# Patient Record
Sex: FEMALE | Race: WHITE | Employment: OTHER | URBAN - METROPOLITAN AREA
[De-identification: names, ages, dates, MRNs, and addresses within clinical notes are randomized per-mention and may not be internally consistent; named-entity substitution may affect disease eponyms.]

---

## 2017-06-24 ENCOUNTER — HOSPITAL ENCOUNTER (OUTPATIENT)
Dept: MAMMOGRAPHY | Age: 72
Discharge: HOME | End: 2017-06-24
Attending: INTERNAL MEDICINE
Payer: MEDICARE

## 2017-06-24 DIAGNOSIS — Z12.31 VISIT FOR SCREENING MAMMOGRAM: ICD-10-CM

## 2017-06-24 PROCEDURE — 77067 SCR MAMMO BI INCL CAD: CPT

## 2017-07-10 PROBLEM — R87.612 LGSIL ON PAP SMEAR OF CERVIX: Status: ACTIVE | Noted: 2017-07-10

## 2017-07-17 PROBLEM — M81.0 SENILE OSTEOPOROSIS: Status: ACTIVE | Noted: 2017-07-17

## 2017-07-28 ENCOUNTER — HOSPITAL ENCOUNTER (OUTPATIENT)
Dept: CT IMAGING | Age: 72
Discharge: HOME | End: 2017-07-28
Attending: INTERNAL MEDICINE
Payer: MEDICARE

## 2017-07-28 DIAGNOSIS — R10.84 ABDOMINAL PAIN, GENERALIZED: ICD-10-CM

## 2017-07-28 DIAGNOSIS — R63.4 WEIGHT LOSS: ICD-10-CM

## 2017-07-28 PROCEDURE — 74177 CT ABD & PELVIS W/CONTRAST: CPT

## 2017-07-28 PROCEDURE — 74011636320 HC RX REV CODE- 636/320: Performed by: INTERNAL MEDICINE

## 2017-07-28 PROCEDURE — 74011000258 HC RX REV CODE- 258: Performed by: INTERNAL MEDICINE

## 2017-07-28 RX ORDER — SODIUM CHLORIDE 0.9 % (FLUSH) 0.9 %
10 SYRINGE (ML) INJECTION
Status: COMPLETED | OUTPATIENT
Start: 2017-07-28 | End: 2017-07-28

## 2017-07-28 RX ADMIN — SODIUM CHLORIDE 100 ML: 900 INJECTION, SOLUTION INTRAVENOUS at 10:39

## 2017-07-28 RX ADMIN — Medication 10 ML: at 10:39

## 2017-07-28 RX ADMIN — DIATRIZOATE MEGLUMINE AND DIATRIZOATE SODIUM 15 ML: 660; 100 LIQUID ORAL; RECTAL at 10:39

## 2017-07-28 RX ADMIN — IOPAMIDOL 100 ML: 755 INJECTION, SOLUTION INTRAVENOUS at 10:39

## 2017-08-04 ENCOUNTER — HOSPITAL ENCOUNTER (OUTPATIENT)
Dept: INFUSION THERAPY | Age: 72
End: 2017-08-04

## 2017-08-25 ENCOUNTER — APPOINTMENT (RX ONLY)
Dept: URBAN - METROPOLITAN AREA CLINIC 349 | Facility: CLINIC | Age: 72
Setting detail: DERMATOLOGY
End: 2017-08-25

## 2017-08-25 DIAGNOSIS — L82.0 INFLAMED SEBORRHEIC KERATOSIS: ICD-10-CM

## 2017-08-25 DIAGNOSIS — L82.1 OTHER SEBORRHEIC KERATOSIS: ICD-10-CM

## 2017-08-25 DIAGNOSIS — Z87.2 PERSONAL HISTORY OF DISEASES OF THE SKIN AND SUBCUTANEOUS TISSUE: ICD-10-CM

## 2017-08-25 DIAGNOSIS — D22 MELANOCYTIC NEVI: ICD-10-CM | Status: STABLE

## 2017-08-25 PROBLEM — D22.5 MELANOCYTIC NEVI OF TRUNK: Status: ACTIVE | Noted: 2017-08-25

## 2017-08-25 PROCEDURE — ? OTHER

## 2017-08-25 PROCEDURE — 17110 DESTRUCTION B9 LES UP TO 14: CPT

## 2017-08-25 PROCEDURE — 99213 OFFICE O/P EST LOW 20 MIN: CPT | Mod: 25

## 2017-08-25 PROCEDURE — ? COUNSELING

## 2017-08-25 PROCEDURE — ? LIQUID NITROGEN (COSMETIC)

## 2017-08-25 PROCEDURE — ? LIQUID NITROGEN

## 2017-08-25 ASSESSMENT — LOCATION SIMPLE DESCRIPTION DERM
LOCATION SIMPLE: LEFT THIGH
LOCATION SIMPLE: RIGHT FOREARM
LOCATION SIMPLE: CHEST
LOCATION SIMPLE: UPPER BACK

## 2017-08-25 ASSESSMENT — LOCATION ZONE DERM
LOCATION ZONE: LEG
LOCATION ZONE: ARM
LOCATION ZONE: TRUNK

## 2017-08-25 ASSESSMENT — LOCATION DETAILED DESCRIPTION DERM
LOCATION DETAILED: LEFT ANTERIOR LATERAL PROXIMAL THIGH
LOCATION DETAILED: LEFT MEDIAL SUPERIOR CHEST
LOCATION DETAILED: INFERIOR THORACIC SPINE
LOCATION DETAILED: RIGHT DISTAL DORSAL FOREARM

## 2017-08-25 NOTE — PROCEDURE: OTHER
Other (Free Text): Quoted patient $100.00 for cosmetic cryo for SK's on legs and arms. Photo taken of SK cryo test spot.
Detail Level: Detailed
Note Text (......Xxx Chief Complaint.): This diagnosis correlates with the

## 2017-08-25 NOTE — PROCEDURE: LIQUID NITROGEN
Detail Level: Detailed
Medical Necessity Information: It is in your best interest to select a reason for this procedure from the list below. All of these items fulfill various CMS LCD requirements except the new and changing color options.
Consent: The patient's consent was obtained including but not limited to risks of crusting, scabbing, blistering, scarring, darker or lighter pigmentary change, recurrence, incomplete removal and infection.
Add 52 Modifier (Optional): no
Post-Care Instructions: I reviewed with the patient in detail post-care instructions. Patient is to wear sunprotection, and avoid picking at any of the treated lesions. Pt may apply Vaseline to crusted or scabbing areas.
Render Post-Care Instructions In Note?: yes
Medical Necessity Clause: This procedure was medically necessary because the lesions that were treated were:

## 2017-09-05 ENCOUNTER — HOSPITAL ENCOUNTER (OUTPATIENT)
Dept: LAB | Age: 72
Discharge: HOME | End: 2017-09-05

## 2017-09-05 PROCEDURE — 88305 TISSUE EXAM BY PATHOLOGIST: CPT | Performed by: INTERNAL MEDICINE

## 2018-06-14 PROBLEM — L90.0 LICHEN SCLEROSUS: Status: ACTIVE | Noted: 2018-06-14

## 2018-06-29 ENCOUNTER — HOSPITAL ENCOUNTER (OUTPATIENT)
Dept: MAMMOGRAPHY | Age: 73
Discharge: HOME | End: 2018-06-29
Attending: INTERNAL MEDICINE
Payer: MEDICARE

## 2018-06-29 DIAGNOSIS — Z12.31 VISIT FOR SCREENING MAMMOGRAM: ICD-10-CM

## 2018-06-29 PROCEDURE — 77063 BREAST TOMOSYNTHESIS BI: CPT

## 2018-08-21 ENCOUNTER — APPOINTMENT (RX ONLY)
Dept: URBAN - METROPOLITAN AREA CLINIC 349 | Facility: CLINIC | Age: 73
Setting detail: DERMATOLOGY
End: 2018-08-21

## 2018-08-21 DIAGNOSIS — L29.8 OTHER PRURITUS: ICD-10-CM

## 2018-08-21 DIAGNOSIS — L29.89 OTHER PRURITUS: ICD-10-CM

## 2018-08-21 DIAGNOSIS — L70.8 OTHER ACNE: ICD-10-CM | Status: RESOLVING

## 2018-08-21 DIAGNOSIS — L82.1 OTHER SEBORRHEIC KERATOSIS: ICD-10-CM

## 2018-08-21 DIAGNOSIS — L81.4 OTHER MELANIN HYPERPIGMENTATION: ICD-10-CM

## 2018-08-21 DIAGNOSIS — Z87.2 PERSONAL HISTORY OF DISEASES OF THE SKIN AND SUBCUTANEOUS TISSUE: ICD-10-CM

## 2018-08-21 DIAGNOSIS — D22 MELANOCYTIC NEVI: ICD-10-CM | Status: STABLE

## 2018-08-21 PROBLEM — D22.5 MELANOCYTIC NEVI OF TRUNK: Status: ACTIVE | Noted: 2018-08-21

## 2018-08-21 PROCEDURE — ? BODY PHOTOGRAPHY

## 2018-08-21 PROCEDURE — ? COUNSELING

## 2018-08-21 PROCEDURE — ? OTHER

## 2018-08-21 PROCEDURE — 99214 OFFICE O/P EST MOD 30 MIN: CPT

## 2018-08-21 ASSESSMENT — LOCATION ZONE DERM
LOCATION ZONE: ARM
LOCATION ZONE: FACE
LOCATION ZONE: LEG
LOCATION ZONE: TRUNK

## 2018-08-21 ASSESSMENT — LOCATION DETAILED DESCRIPTION DERM
LOCATION DETAILED: LEFT ANTERIOR LATERAL PROXIMAL THIGH
LOCATION DETAILED: INFERIOR THORACIC SPINE
LOCATION DETAILED: RIGHT MEDIAL UPPER BACK
LOCATION DETAILED: LEFT PROXIMAL DORSAL FOREARM
LOCATION DETAILED: LEFT PROXIMAL PRETIBIAL REGION
LOCATION DETAILED: RIGHT ANTERIOR PROXIMAL THIGH
LOCATION DETAILED: RIGHT INFERIOR LATERAL BUCCAL CHEEK

## 2018-08-21 ASSESSMENT — LOCATION SIMPLE DESCRIPTION DERM
LOCATION SIMPLE: RIGHT THIGH
LOCATION SIMPLE: LEFT FOREARM
LOCATION SIMPLE: RIGHT CHEEK
LOCATION SIMPLE: UPPER BACK
LOCATION SIMPLE: RIGHT UPPER BACK
LOCATION SIMPLE: LEFT THIGH
LOCATION SIMPLE: LEFT PRETIBIAL REGION

## 2018-08-21 NOTE — PROCEDURE: BODY PHOTOGRAPHY
Number Of Photographs (Optional- Will Not Render If 0): 1
Consent: Written consent obtained, risks reviewed for whole body photography. Patient understands that photograph costs may not be covered by insurance, and patient is ultimately responsible for payment.
Reason For Photography: The patient is obtaining body photography to observe existing suspicious moles and or monitor for the appearance of any new lesions.
Detail Level: Detailed
Whole Body Statement: The whole body was photographed today.
Was The Entire Body Photographed (Cannot Bill Unless Entire Body Photographed)?: No

## 2018-08-21 NOTE — PROCEDURE: OTHER
Detail Level: Detailed
Other (Free Text): Quoted patient $100.00 for 15 lesions
Other (Free Text): Recommended over the counter Hydrocortisone
Note Text (......Xxx Chief Complaint.): This diagnosis correlates with the

## 2018-10-16 ENCOUNTER — APPOINTMENT (RX ONLY)
Dept: URBAN - METROPOLITAN AREA CLINIC 349 | Facility: CLINIC | Age: 73
Setting detail: DERMATOLOGY
End: 2018-10-16

## 2018-10-31 ENCOUNTER — APPOINTMENT (OUTPATIENT)
Dept: PHYSICAL THERAPY | Age: 73
End: 2018-10-31
Attending: INTERNAL MEDICINE

## 2019-04-26 ENCOUNTER — APPOINTMENT (RX ONLY)
Dept: URBAN - METROPOLITAN AREA CLINIC 349 | Facility: CLINIC | Age: 74
Setting detail: DERMATOLOGY
End: 2019-04-26

## 2019-04-26 DIAGNOSIS — L43.8 OTHER LICHEN PLANUS: ICD-10-CM

## 2019-04-26 DIAGNOSIS — Z71.89 OTHER SPECIFIED COUNSELING: ICD-10-CM

## 2019-04-26 PROCEDURE — ? OTHER

## 2019-04-26 PROCEDURE — ? EDUCATIONAL RESOURCES PROVIDED

## 2019-04-26 PROCEDURE — ? OBSERVATION

## 2019-04-26 PROCEDURE — 99213 OFFICE O/P EST LOW 20 MIN: CPT

## 2019-04-26 PROCEDURE — ? COUNSELING

## 2019-04-26 ASSESSMENT — LOCATION ZONE DERM: LOCATION ZONE: TRUNK

## 2019-04-26 ASSESSMENT — LOCATION SIMPLE DESCRIPTION DERM: LOCATION SIMPLE: CHEST

## 2019-04-26 ASSESSMENT — LOCATION DETAILED DESCRIPTION DERM: LOCATION DETAILED: LEFT MEDIAL SUPERIOR CHEST

## 2019-04-26 NOTE — PROCEDURE: OTHER
Note Text (......Xxx Chief Complaint.): This diagnosis correlates with the
Other (Free Text): Per patient, this lesion has been present for 2 weeks. \\nDiscussed OTC hydrocortisone for irritation if needed.
Detail Level: Detailed

## 2019-06-20 ENCOUNTER — HOSPITAL ENCOUNTER (OUTPATIENT)
Dept: GENERAL RADIOLOGY | Age: 74
Discharge: HOME | End: 2019-06-20
Attending: INTERNAL MEDICINE
Payer: MEDICARE

## 2019-06-20 DIAGNOSIS — R14.0 ABDOMINAL DISTENSION (GASEOUS): ICD-10-CM

## 2019-06-20 PROCEDURE — 74022 RADEX COMPL AQT ABD SERIES: CPT

## 2019-06-21 ENCOUNTER — HOSPITAL ENCOUNTER (OUTPATIENT)
Dept: CT IMAGING | Age: 74
Discharge: HOME | End: 2019-06-21
Attending: INTERNAL MEDICINE
Payer: MEDICARE

## 2019-06-21 DIAGNOSIS — K58.1 IRRITABLE BOWEL SYNDROME WITH CONSTIPATION: ICD-10-CM

## 2019-06-21 DIAGNOSIS — R10.31 RLQ ABDOMINAL PAIN: ICD-10-CM

## 2019-06-21 LAB — CREAT BLD-MCNC: 0.6 MG/DL (ref 0.8–1.5)

## 2019-06-21 PROCEDURE — 74011000258 HC RX REV CODE- 258: Performed by: INTERNAL MEDICINE

## 2019-06-21 PROCEDURE — 74011636320 HC RX REV CODE- 636/320: Performed by: INTERNAL MEDICINE

## 2019-06-21 PROCEDURE — 74177 CT ABD & PELVIS W/CONTRAST: CPT

## 2019-06-21 PROCEDURE — 82565 ASSAY OF CREATININE: CPT

## 2019-06-21 RX ORDER — SODIUM CHLORIDE 0.9 % (FLUSH) 0.9 %
10 SYRINGE (ML) INJECTION
Status: COMPLETED | OUTPATIENT
Start: 2019-06-21 | End: 2019-06-21

## 2019-06-21 RX ADMIN — Medication 10 ML: at 15:18

## 2019-06-21 RX ADMIN — SODIUM CHLORIDE 100 ML: 900 INJECTION, SOLUTION INTRAVENOUS at 15:18

## 2019-06-21 RX ADMIN — IOPAMIDOL 100 ML: 755 INJECTION, SOLUTION INTRAVENOUS at 15:18

## 2019-07-28 ENCOUNTER — HOSPITAL ENCOUNTER (EMERGENCY)
Age: 74
Discharge: HOME | End: 2019-07-29
Attending: EMERGENCY MEDICINE
Payer: MEDICARE

## 2019-07-28 ENCOUNTER — APPOINTMENT (OUTPATIENT)
Dept: GENERAL RADIOLOGY | Age: 74
End: 2019-07-28
Attending: EMERGENCY MEDICINE
Payer: MEDICARE

## 2019-07-28 DIAGNOSIS — S96.921A FOOT LACERATION INVOLVING TENDON, RIGHT, INITIAL ENCOUNTER: Primary | ICD-10-CM

## 2019-07-28 DIAGNOSIS — S91.311A FOOT LACERATION INVOLVING TENDON, RIGHT, INITIAL ENCOUNTER: Primary | ICD-10-CM

## 2019-07-28 PROCEDURE — 73630 X-RAY EXAM OF FOOT: CPT

## 2019-07-28 PROCEDURE — 90715 TDAP VACCINE 7 YRS/> IM: CPT | Performed by: EMERGENCY MEDICINE

## 2019-07-28 PROCEDURE — 99283 EMERGENCY DEPT VISIT LOW MDM: CPT | Performed by: EMERGENCY MEDICINE

## 2019-07-28 PROCEDURE — 74011250636 HC RX REV CODE- 250/636: Performed by: EMERGENCY MEDICINE

## 2019-07-28 PROCEDURE — 90471 IMMUNIZATION ADMIN: CPT | Performed by: EMERGENCY MEDICINE

## 2019-07-28 RX ADMIN — TETANUS TOXOID, REDUCED DIPHTHERIA TOXOID AND ACELLULAR PERTUSSIS VACCINE, ADSORBED 0.5 ML: 5; 2.5; 8; 8; 2.5 SUSPENSION INTRAMUSCULAR at 23:58

## 2019-07-29 VITALS
WEIGHT: 108 LBS | SYSTOLIC BLOOD PRESSURE: 135 MMHG | RESPIRATION RATE: 18 BRPM | DIASTOLIC BLOOD PRESSURE: 62 MMHG | TEMPERATURE: 98.5 F | HEIGHT: 65 IN | OXYGEN SATURATION: 99 % | BODY MASS INDEX: 17.99 KG/M2 | HEART RATE: 82 BPM

## 2019-07-29 PROCEDURE — 77030002916 HC SUT ETHLN J&J -A

## 2019-07-29 PROCEDURE — 75810000293 HC SIMP/SUPERF WND  RPR: Performed by: EMERGENCY MEDICINE

## 2019-07-29 NOTE — ED PROVIDER NOTES
A large glass jar/water bottle struck the edge of a cabinet and broke falling down onto her right foot causing laceration, pain and swelling. She could not get the wound to stop bleeding and presented for evaluation. The history is provided by the patient. Laceration    The incident occurred 1 to 2 hours ago. Pain location: right foot. The laceration is 2 cm in size. Injury mechanism: broken glass. Foreign body present: unknown. The pain is mild. The pain has been constant since onset. Associated symptoms include weakness and loss of motion. Pertinent negatives include no numbness, no tingling, no coolness and no discoloration. The patient's last tetanus shot was more than 10 years ago.        Past Medical History:   Diagnosis Date    Abnormal Pap smear of cervix 7/19/2016    Breast lump     Cancer ([facility] [address] 2010    stage 1 polyps x2 removed during colonscopy    Hypertension     Osteoporosis     Unspecified essential hypertension 10/17/2012       Past Surgical History:   Procedure Laterality Date    BREAST SURGERY PROCEDURE UNLISTED Left 1968    lumpectomy~benign    ENDOSCOPY, COLON, DIAGNOSTIC      polyps    HX BREAST BIOPSY      HX COLONOSCOPY      HX GI      HX GYN      LEEP    HX GYN      colposcopy    HX GYN      [facility]    HX OTHER SURGICAL      colonscopy         Family History:   Problem Relation Age of Onset    Cancer Brother         Brain Tumor    Hypertension Mother     Obesity Mother     Alzheimer Mother     Cancer Father         Lung    Lung Disease Father     Breast Cancer Neg Hx     Colon Cancer Neg Hx        Social History     Socioeconomic History    Marital status:      Spouse name: Not on file    Number of children: Not on file    Years of education: Not on file    Highest education level: Not on file   Occupational History    Not on file   Social Needs    Financial resource strain: Not on file    Food insecurity:     Worry: Not on file     Inability: Not on file  Transportation needs:     Medical: Not on file     Non-medical: Not on file   Tobacco Use    Smoking status: Former Smoker     Packs/day: 1.00     Years: 18.00     Pack years: 18.00     Last attempt to quit: 10/17/1993     Years since quittin.7    Smokeless tobacco: Never Used   Substance and Sexual Activity    Alcohol use: Yes     Alcohol/week: 1.0 standard drinks     Types: 1 - 2 Glasses of wine per week     Comment: per night    Drug use: No    Sexual activity: Never     Birth control/protection: None   Lifestyle    Physical activity:     Days per week: Not on file     Minutes per session: Not on file    Stress: Not on file   Relationships    Social connections:     Talks on phone: Not on file     Gets together: Not on file     Attends Shinto service: Not on file     Active member of club or organization: Not on file     Attends meetings of clubs or organizations: Not on file     Relationship status: Not on file    Intimate partner violence:     Fear of current or ex partner: Not on file     Emotionally abused: Not on file     Physically abused: Not on file     Forced sexual activity: Not on file   Other Topics Concern     Service Not Asked    Blood Transfusions Not Asked    Caffeine Concern No     Comment: coffee, 3-4 times per day    Occupational Exposure Not Asked   Shelvy Go Hazards Not Asked    Sleep Concern Not Asked    Stress Concern Not Asked    Weight Concern Not Asked    Special Diet Not Asked    Back Care Not Asked    Exercise Yes     Comment: walking, aerobics, yoga, 5-6 times per week    Bike Helmet Not Asked    Seat Belt Yes     Comment: always    Self-Exams Yes     Comment: always breast exam   Social History Narrative    Abuse: Feels safe at home, no history of physical abuse, no history of sexual abuse              ALLERGIES: Erythromycin; Penicillin g; Erythromycin; and Pcn [penicillins]    Review of Systems   Neurological: Positive for weakness.  Negative for tingling and numbness. Vitals:    07/28/19 2144   BP: (!) 145/92   Pulse: 98   Resp: 17   Temp: 98.1 °F (36.7 °C)   SpO2: 97%   Weight: 49 kg (108 lb)   Height: 5' 5\" (1.651 m)            Physical Exam   Constitutional: She appears well-developed and well-nourished. Musculoskeletal: She exhibits tenderness (tenderness overright fifth toe and right distal fifth metatarsal.  Limited to no range of motion of left fifth toe. Unable to extend. Sensation intact. Cap refill less than 2 seconds). To posterior sinus pedis and posterior tibial pulses. No pulsatile bleeding from the wound. There is a underlying soft tissue hematoma present   Nursing note and vitals reviewed. MDM  Number of Diagnoses or Management Options  Diagnosis management comments:  Stunned nerve versus injured tendon. X-ray to evaluate for foreign body and rule out fracture. Anesthetized the wound and explore for foreign body and suture. Follow-up for tendon function recheck with orthopedics in the next few days. Amount and/or Complexity of Data Reviewed  Tests in the radiology section of CPT®: ordered and reviewed (X-ray of the right foot shows no fracture and no foreign body. Read by ED physician.)           Wound Repair  Date/Time: 7/29/2019 12:31 AM  Performed by: attendingPreparation: skin prepped with ChloraPrep  Location: right dorsal foot.   Wound length:2.5 cm or less  Anesthesia: local infiltration    Anesthesia:  Local Anesthetic: lidocaine 1% without epinephrine  Anesthetic total: 4 mL  Foreign bodies: no foreign bodies  Irrigation solution: saline  Irrigation method: syringe  Debridement: none  Skin closure: 4-0 nylon  Number of sutures: 5  Technique: simple  Approximation: close  Dressing: antibiotic ointment, gauze roll and pressure dressing  Patient tolerance: Patient tolerated the procedure well with no immediate complications  My total time at bedside, performing this procedure was 1-15 minutes. Comments: I could not visualize tendon to evaluate for injury. No foreign body was visualized or palpated with hemostats.

## 2019-07-29 NOTE — DISCHARGE INSTRUCTIONS
Patient Education   Keep the wound dry for 24 hours then clean it twice daily with antibacterial hand soap and water, dry it with a clean towel/cloth, then apply otc antibiotic ointment and cover it with a bandage or bandaide. You may stop the ointment after 2-3 days but continue to clean, dry and cover the wound until it is healed and ready for suture or staple removal.  Return if signs of infection-increased pain, warmth, redness or pus from the wound. Sutures out in _10__ days-here or with your Dr or the Dr provided. Follow up with [facility] sometime in the early part of this week for wound recheck and tendon recheck. Call in the morning for appointment         Cuts: Care Instructions  Your Care Instructions  A cut can happen anywhere on your body. Stitches, staples, skin adhesives, or pieces of tape called Steri-Strips are sometimes used to keep the edges of a cut together and help it heal. Steri-Strips can be used by themselves or with stitches or staples. Sometimes cuts are left open. If the cut went deep and through the skin, the doctor may have closed the cut in two layers. A deeper layer of stitches brings the deep part of the cut together. These stitches will dissolve and don't need to be removed. The upper layer closure, which could be stitches, staples, Steri-Strips, or adhesive, is what you see on the cut. A cut is often covered by a bandage. The doctor has checked you carefully, but problems can develop later. If you notice any problems or new symptoms, get medical treatment right away. Follow-up care is a key part of your treatment and safety. Be sure to make and go to all appointments, and call your doctor if you are having problems. It's also a good idea to know your test results and keep a list of the medicines you take. How can you care for yourself at home? If a cut is open or closed  · Prop up the sore area on a pillow anytime you sit or lie down during the next 3 days.  Try to keep it above the level of your heart. This will help reduce swelling. · Keep the cut dry for the first 24 to 48 hours. After this, you can shower if your doctor okays it. Pat the cut dry. · Don't soak the cut, such as in a bathtub. Your doctor will tell you when it's safe to get the cut wet. · After the first 24 to 48 hours, clean the cut with soap and water 2 times a day unless your doctor gives you different instructions. ? Don't use hydrogen peroxide or alcohol, which can slow healing. ? You may cover the cut with a thin layer of petroleum jelly and a nonstick bandage. ? If the doctor put a bandage over the cut, put on a new bandage after cleaning the cut or if the bandage gets wet or dirty. · Avoid any activity that could cause your cut to reopen. · Be safe with medicines. Read and follow all instructions on the label. ? If the doctor gave you a prescription medicine for pain, take it as prescribed. ? If you are not taking a prescription pain medicine, ask your doctor if you can take an over-the-counter medicine. If the cut is closed with stitches, staples, or Steri-Strips  · Follow the above instructions for open or closed cuts. · Do not remove the stitches or staples on your own. Your doctor will tell you when to come back to have the stitches or staples removed. · Leave Steri-Strips on until they fall off. If the cut is closed with a skin adhesive  · Follow the above instructions for open or closed cuts. · Leave the skin adhesive on your skin until it falls off on its own. This may take 5 to 10 days. · Do not scratch, rub, or pick at the adhesive. · Do not put the sticky part of a bandage directly on the adhesive. · Do not put any kind of ointment, cream, or lotion over the area. This can make the adhesive fall off too soon. Do not use hydrogen peroxide or alcohol, which can slow healing. When should you call for help?   Call your doctor now or seek immediate medical care if:    · You have new pain, or your pain gets worse.     · The skin near the cut is cold or pale or changes color.     · You have tingling, weakness, or numbness near the cut.     · The cut starts to bleed, and blood soaks through the bandage. Oozing small amounts of blood is normal.     · You have trouble moving the area near the cut.     · You have symptoms of infection, such as:  ? Increased pain, swelling, warmth, or redness around the cut.  ? Red streaks leading from the cut.  ? Pus draining from the cut.  ? A fever.    Watch closely for changes in your health, and be sure to contact your doctor if:    · The cut reopens.     · You do not get better as expected. Where can you learn more? Go to http://jorge-vamshi.info/. Enter M735 in the search box to learn more about \"Cuts: Care Instructions. \"  Current as of: September 23, 2018  Content Version: 12.1  © 4046-8664 [employer]. Care instructions adapted under license by [employer] (which disclaims liability or warranty for this information). If you have questions about a medical condition or this instruction, always ask your healthcare professional. [address] any warranty or liability for your use of this information.

## 2019-07-29 NOTE — ED NOTES
I have reviewed discharge instructions with the patient. The patient verbalized understanding. Patient left ED via Discharge Method: ambulatory to Home with  self). Opportunity for questions and clarification provided. Patient given 0 scripts. To continue your aftercare when you leave the hospital, you may receive an automated call from our care team to check in on how you are doing. This is a free service and part of our promise to provide the best care and service to meet your aftercare needs.  If you have questions, or wish to unsubscribe from this service please call [phone]. Thank you for Choosing our [facility] Emergency Department.

## 2019-07-29 NOTE — ED TRIAGE NOTES
Pt presents with laceration on the top of her right foot a glass shattered and fell onto her foot. Pt is unsure of the date of her last tetanus shot.

## 2019-08-09 ENCOUNTER — HOSPITAL ENCOUNTER (OUTPATIENT)
Dept: MAMMOGRAPHY | Age: 74
Discharge: HOME | End: 2019-08-09
Attending: INTERNAL MEDICINE
Payer: MEDICARE

## 2019-08-09 DIAGNOSIS — Z12.39 BREAST CANCER SCREENING: ICD-10-CM

## 2019-08-09 PROCEDURE — 77063 BREAST TOMOSYNTHESIS BI: CPT

## 2020-05-07 ENCOUNTER — HOSPITAL ENCOUNTER (OUTPATIENT)
Dept: GENERAL RADIOLOGY | Age: 75
Discharge: HOME | End: 2020-05-07

## 2020-05-07 DIAGNOSIS — R61 NIGHT SWEAT: ICD-10-CM

## 2020-05-07 DIAGNOSIS — R68.89 LOW BODY TEMPERATURE: ICD-10-CM

## 2020-06-15 ENCOUNTER — TELEPHONE (OUTPATIENT)
Dept: NUTRITION | Age: 75
End: 2020-06-15

## 2020-06-15 NOTE — TELEPHONE ENCOUNTER
Nutrition Counseling: This is a duplicate referral with an added diagnosis.   Diagnosis  from this referral was added to previous referral and we will close out this referral.    Srini Casas  [phone]

## 2020-08-17 ENCOUNTER — TELEPHONE (OUTPATIENT)
Dept: NUTRITION | Age: 75
End: 2020-08-17

## 2020-08-17 NOTE — TELEPHONE ENCOUNTER
Nutrition Counseling: Contacted pt regarding referral. See notes documented in Nutrition Counseling Referral for details. No further follow-up contact from pt. Will close referral for this office.     [facility]  [phone]

## 2020-09-17 ENCOUNTER — HOSPITAL ENCOUNTER (OUTPATIENT)
Dept: LAB | Age: 75
Discharge: HOME | End: 2020-09-17

## 2020-09-17 PROCEDURE — 88305 TISSUE EXAM BY PATHOLOGIST: CPT

## 2020-09-23 ENCOUNTER — HOSPITAL ENCOUNTER (OUTPATIENT)
Dept: NUTRITION | Age: 75
Discharge: HOME | End: 2020-09-23
Payer: MEDICARE

## 2020-09-23 PROCEDURE — 97802 MEDICAL NUTRITION INDIV IN: CPT

## 2020-09-23 NOTE — PROGRESS NOTES
NUTRITION ASSESSMENT:  Pt did not bring nutrition assessment forms filled out for appointment. Reviewed assessment information to the degree that time permitted. 45 minute appt. Patient History:  Pt is referred by Dr. Valentín Escobar with diagnoses of low body temperature, dizziness, malaise, and abnormal wt loss. Medical history additionally remarkable for osteoporosis, HTN, Cancer (stage 1 polyps x2 removed during colonoscopy). Pt arrives today and gives a history of symptoms described as \"in the past year, I have had flu-like symptoms meaning I ache- just feel sick. I have good moments each day where I can shop or drive. But then I will feel fatigued at other times. \" States she measures her temperature at night and is very concerned that it \"drops to 95. 3. \" States her normal temp is 96.8-97.5. Regarding wt loss pt notes she has lost weight secondary to symptoms of pain associated with some foods or larger meals. But then notes she has found a digestive enzyme supplement that has helped greatly reduce pain. Does tend to have constipation for which she takes magnesium. States she works with a chiropractor who has advised she needs more fiber and protein. RD has access to weight records in system and discussed how pt felt when she weighed 123lb in 2013. She states \"I felt good then. \"  States she \"was heavy\" then. Pt also notes that she felt good when she weighed 112lb. States she would like to weight 110lb. Reports a barrier as \"food is the problem\" and describes to mean that if she experiences pain after eating, she tends to not be able to eat as much. Referring provider clinicals indicate  Physical activity as walking, aerobics, yoga, 5-6 times per week    Pertinent Labs:   (3/10/20) T Chol 231   (5/7/20) Mag 2.6    Nutritionally Relevant Medications:  Reviewed.     Supplements:  Vitamin B12 1000mcg  Vitamin C 1000mg  Vitamin D3 1000iu  Vitamin E 180mg  Magnesium 250mg or 400mg  Zinc 25mg  Digestive Gold 1 with each meal  Colon Health Probiotics with digestive enzymes (1/day)    Factors affecting weight status:   Oral status (dentition): no issues noted.  Smell and taste: no issues noted.  Appetite: Pt describes a desire to eat but notes she is limited on foods (avoids some foods, but isn't specific besides nuts and dairy that she notes osteoporsis consultant has advised her to avoid; was unable to clarify why but notes this person has \"helped her a lot\")   Swallow capacity (dysphagia): no issues noted.  Neurological factors (i.e.dementia, Parkinson's, stroke): no issues noted.  Gastrointestinal: Is somewhat vague but notes she will have pain in her intestinal area (pats lower abdomin) if she doesn't eat the right foods.  Social factors and living conditions: no issues noted.  Food accessibility:no issues noted. Nutrition-Focus Physical Exam findings:  Orbital hollowing observed; temporal and clavicular wasting observed. FOOD/NUTRITION HISTORY:   Pt eats 3 meals/day with unclear on snacks. Diet appears low in calcium-containing choices, low in servings of fruits and vegetables, low in fiber, low in protein at lunch, and low in kcal.. Pt drinks distilled water/day, \"a lot\" of herbal tea/day (states it can cause some pain), and occasional coffee/day. Pt appears able to obtain appropriate nutritional choices as desired. Diet Recall:  Snack: Ensure 1/2  Breakfast:3 hard boiled eggs, 1 slice whole grain toast, dark chocolate   Snack: Ensure 1/2  Lunch:  Blueberries, apple, whole grain cereal  Dinner:  Chicken breast or salmon, 1 small boiled potato, green beans      ANTHROPOMETRIC DATA:  Ht: 5'5 (1.651 m)  Wt: 104.2lb - 1lb (clothes, shoes) = 103.3lb (46.8 kg) Measured in office  BMI: 17.1 (Underweight BMI)   BMI of >22 recommended for geriatrics pts. Optimal BMI: 23-30. IBW for minimum BMI: 132lb;  Highest weight in past 7 years: 123lb 9.6oz    Weight history in [facility]:  WT / BMI WEIGHT BODY MASS INDEX   5/26/2020 100 lb 16.64 kg/m2   5/12/2020 100 lb 16.64 kg/m2   5/7/2020 103 lb 17.14 kg/m2   5/1/2020 100 lb 16.64 kg/m2   3/10/2020 102 lb 3.2 oz 17.01 kg/m2   1/6/2020 104 lb 17.31 kg/m2   8/12/2019 106 lb 17.64 kg/m2   8/8/2019 107 lb 6.4 oz 17.87 kg/m2   7/28/2019 108 lb 17.97 kg/m2   1/10/2019 113 lb 18.8 kg/m2   10/29/2018 113 lb 9.6 oz 18.9 kg/m2   10/25/2018 110 lb 18.3 kg/m2   9/6/2018 112 lb 3.2 oz 18.11 kg/m2   6/14/2018 111 lb 17.92 kg/m2   1/30/2018 114 lb 18.4 kg/m2   12/13/2017 112 lb 18.08 kg/m2   8/22/2017 109 lb 17.59 kg/m2   8/2/2017 104 lb 16.79 kg/m2   7/17/2017 106 lb 17.11 kg/m2   7/10/2017 107 lb 17.27 kg/m2   6/6/2017 103 lb 16.62 kg/m2   2/6/2017 113 lb 18.24 kg/m2   10/10/2016 110 lb 17.23 kg/m2   6/10/2016 108 lb 2 oz 17.46 kg/m2   6/3/2016 108 lb 2 oz 17.46 kg/m2   5/8/2015 117 lb 18.89 kg/m2   4/3/2014 118 lb 12.8 oz 19.18 kg/m2   3/21/2013 123 lb 9.6 oz 19.96 kg/m2   10/17/2012 122 lb 19.7 kg/m2       Estimated Nutritional Needs to Maintain Current Wt using [employer]. Jeor Equation, activity factor of 1.3:   1261 kcal/day +/- 10% MSJ margin of error     EER for weight gain: 1300 + 300 = 1600 kcal/day  Carbohydrates: 180 gm/day (45% of  kcal) or 45 gm/meal(3) with 30 gm/snack(2-3) (Approximately 300-400 kcal/meal with 200-300 kcal/snack)  Protein: 78gm/day (20% of kcal)  Fat: 61 gm/day (35% of  kcal)  Fluids: 1.5 L/day (1 ml/kcal)    NUTRITION DIAGNOSIS:   Inadequate oral intake r/t altered GI function as evidenced by pt report of intestinal pain following some meals or following consumption of certain foods. NUTRITION INTERVENTION:  Appointment length: 45 minutes. Nutrition Education:   · Discussed pts intake and activity patterns as above. Gave details for weight gain strategies.  (Guidelines in handout)   · Discussed the strategy of 3 balanced meals and 2 -3 planned snacks in between meals- building up tolerance to the portions RD recommends on MyPlate template given to pt (Reviewed how to use the template as much as time permitted). Encouraged pt to write down a few days of intake for a follow-up appointment that pt notes she'd like to schedule. Motivational Interviewing:   · Discussed pt present health risks with underweight BMI. Explained benefits of increasing wt and improving BMI class. · Reminded pt that she had noted she felt better when she weighed more. Discussed pt's willingness to gain weight. Pt states she is willing but \"food is the problem. \" (If experiencing pain)  · Agreed with pt that if she can gain back some weight, she can observe if that is one factor that may help with her fatigue. · Explained BMI to pt and the recommendations for her age group. Pt has never had a BMI of 22 or greater. Noted regaining weight to a former adult UBW would be a good step. Goal Setting:  Goal: Pt will increase caloric intake and structure eating patterns and food choices to promote wt gain @ 0.5-1#/week. Plan: Pt will use My Plate template to build adequate nutrition at meals and have 2 snacks/day ( each of 1 whole container of Ensure Enlive.)    Materials Given:  MNT Gaining Weight Guidelines  \"My Plate\" templates for meal planning  CIB homemade shake recipes (500-700 kcal)  Hints to Increase Calories and Protein    MONITORING AND EVALUATION DETAILS:  Follow up appt: 10/14/20 @ 11:00pm.  Follow-up Monitoring Plans: Will monitor any wt change. Will assess and address any barriers to or success with plans. Will review meals and snack logs to compare with goals.     Pavan Marion, MS, RD, CSSD, LD  W: [phone]

## 2020-09-30 ENCOUNTER — TRANSCRIBE ORDER (OUTPATIENT)
Dept: SCHEDULING | Age: 75
End: 2020-09-30

## 2020-09-30 DIAGNOSIS — Z12.31 VISIT FOR SCREENING MAMMOGRAM: Primary | ICD-10-CM

## 2020-10-01 ENCOUNTER — DOCUMENTATION ONLY (OUTPATIENT)
Dept: NUTRITION | Age: 75
End: 2020-10-01

## 2020-10-01 NOTE — PROGRESS NOTES
Nutrition Counseling:  Pt is referred by Dr. Igor Montoya. Pt left VM to cancel her f/u appt. States she has family coming into town and that she will call to reschedule.     Jesse Prajapati MS, RD, CSSD, LD  W: [phone]

## 2020-10-14 ENCOUNTER — HOSPITAL ENCOUNTER (OUTPATIENT)
Dept: NUTRITION | Age: 75
End: 2020-10-14

## 2020-10-21 ENCOUNTER — HOSPITAL ENCOUNTER (OUTPATIENT)
Dept: MAMMOGRAPHY | Age: 75
Discharge: HOME | End: 2020-10-21
Attending: INTERNAL MEDICINE
Payer: MEDICARE

## 2020-10-21 DIAGNOSIS — Z12.31 VISIT FOR SCREENING MAMMOGRAM: ICD-10-CM

## 2020-10-21 PROCEDURE — 77063 BREAST TOMOSYNTHESIS BI: CPT

## 2020-10-27 ENCOUNTER — TELEPHONE (OUTPATIENT)
Dept: NUTRITION | Age: 75
End: 2020-10-27

## 2020-10-27 PROBLEM — K58.9 IBS (IRRITABLE BOWEL SYNDROME): Status: ACTIVE | Noted: 2020-10-27

## 2020-10-27 NOTE — TELEPHONE ENCOUNTER
Nutrition Counseling:Pt is referred by Dr. Oneil White. Left VM requesting call back in attempt to check on any f/u rescheduling needs.     Aleena Tao MS, RD, CSSD, LD  W: [phone]
No

## 2020-11-09 ENCOUNTER — TELEPHONE (OUTPATIENT)
Dept: NUTRITION | Age: 75
End: 2020-11-09

## 2020-11-09 NOTE — TELEPHONE ENCOUNTER
Nutrition Counseling:  Pt referred by Dr. Catalina Nelson. Spoke with pt to see if pt would like to reschedule her cancelled follow-up appt. Pt states she is still experiencing barriers to increasing her food intake. States she has added a few things but already was using ideas I recommended. Declines follow-up at this time as she states she is continuing with appts to determine what is causing her stomach pain. Notes she presently has a gum infection and will have that assessed. Will close referral on this end.      Holly Calderon, MS, RD, CSSD, LD  W: [phone]

## 2021-06-01 PROBLEM — Z12.39 BREAST CANCER SCREENING: Status: ACTIVE | Noted: 2021-06-01

## 2021-06-01 PROBLEM — R53.83 FATIGUE: Status: ACTIVE | Noted: 2021-06-01

## 2021-06-01 PROBLEM — R87.612 LGSIL ON PAP SMEAR OF CERVIX: Status: RESOLVED | Noted: 2017-07-10 | Resolved: 2021-06-01

## 2021-06-01 PROBLEM — L90.0 LICHEN SCLEROSUS: Status: RESOLVED | Noted: 2018-06-14 | Resolved: 2021-06-01

## 2021-06-01 PROBLEM — R74.01 ELEVATED ALT MEASUREMENT: Status: ACTIVE | Noted: 2021-06-01

## 2021-06-01 PROBLEM — M79.10 MYALGIA: Status: ACTIVE | Noted: 2021-06-01

## 2021-06-01 PROBLEM — Z13.220 LIPID SCREENING: Status: ACTIVE | Noted: 2021-06-01

## 2021-06-28 ENCOUNTER — HOSPITAL ENCOUNTER (OUTPATIENT)
Dept: ULTRASOUND IMAGING | Age: 76
Discharge: HOME | End: 2021-06-28
Attending: INTERNAL MEDICINE

## 2021-06-28 DIAGNOSIS — R10.10 PAIN OF UPPER ABDOMEN: ICD-10-CM

## 2021-06-28 DIAGNOSIS — R74.01 ELEVATED ALT MEASUREMENT: ICD-10-CM

## 2021-07-19 ENCOUNTER — TRANSCRIBE ORDER (OUTPATIENT)
Dept: SCHEDULING | Age: 76
End: 2021-07-19

## 2021-07-19 DIAGNOSIS — R11.0 NAUSEA: ICD-10-CM

## 2021-07-19 DIAGNOSIS — R63.4 LOSS OF WEIGHT: ICD-10-CM

## 2021-07-19 DIAGNOSIS — R10.84 DIFFUSE ABDOMINAL PAIN: Primary | ICD-10-CM

## 2021-07-20 PROBLEM — Z23 ENCOUNTER FOR IMMUNIZATION: Status: ACTIVE | Noted: 2021-07-20

## 2021-07-21 ENCOUNTER — HOSPITAL ENCOUNTER (OUTPATIENT)
Dept: CT IMAGING | Age: 76
Discharge: HOME | End: 2021-07-21
Attending: NURSE PRACTITIONER

## 2021-07-21 DIAGNOSIS — R11.0 NAUSEA: ICD-10-CM

## 2021-07-21 DIAGNOSIS — R63.4 LOSS OF WEIGHT: ICD-10-CM

## 2021-07-21 DIAGNOSIS — R10.84 DIFFUSE ABDOMINAL PAIN: ICD-10-CM

## 2021-07-21 RX ORDER — SODIUM CHLORIDE 0.9 % (FLUSH) 0.9 %
10 SYRINGE (ML) INJECTION
Status: COMPLETED | OUTPATIENT
Start: 2021-07-21 | End: 2021-07-21

## 2021-07-21 RX ADMIN — Medication 10 ML: at 10:19

## 2021-08-04 PROBLEM — K86.81 EXOCRINE PANCREATIC INSUFFICIENCY: Status: ACTIVE | Noted: 2021-08-04

## 2021-08-20 ENCOUNTER — HOSPITAL ENCOUNTER (INPATIENT)
Age: 76
LOS: 1 days | Discharge: HOME | DRG: 641 | End: 2021-08-22
Attending: EMERGENCY MEDICINE | Admitting: FAMILY MEDICINE
Payer: MEDICARE

## 2021-08-20 DIAGNOSIS — E87.1 HYPONATREMIA: Primary | ICD-10-CM

## 2021-08-20 DIAGNOSIS — R53.1 WEAKNESS: ICD-10-CM

## 2021-08-20 DIAGNOSIS — R53.83 FATIGUE, UNSPECIFIED TYPE: ICD-10-CM

## 2021-08-20 PROCEDURE — 93005 ELECTROCARDIOGRAM TRACING: CPT | Performed by: EMERGENCY MEDICINE

## 2021-08-20 PROCEDURE — 80053 COMPREHEN METABOLIC PANEL: CPT

## 2021-08-20 PROCEDURE — 83605 ASSAY OF LACTIC ACID: CPT

## 2021-08-20 PROCEDURE — 83690 ASSAY OF LIPASE: CPT

## 2021-08-20 PROCEDURE — 85025 COMPLETE CBC W/AUTO DIFF WBC: CPT

## 2021-08-20 PROCEDURE — 96360 HYDRATION IV INFUSION INIT: CPT

## 2021-08-20 PROCEDURE — 99285 EMERGENCY DEPT VISIT HI MDM: CPT

## 2021-08-20 RX ORDER — SODIUM CHLORIDE 0.9 % (FLUSH) 0.9 %
5-10 SYRINGE (ML) INJECTION EVERY 8 HOURS
Status: DISCONTINUED | OUTPATIENT
Start: 2021-08-20 | End: 2021-08-22 | Stop reason: HOSPADM

## 2021-08-20 RX ORDER — SODIUM CHLORIDE 0.9 % (FLUSH) 0.9 %
5-10 SYRINGE (ML) INJECTION AS NEEDED
Status: DISCONTINUED | OUTPATIENT
Start: 2021-08-20 | End: 2021-08-22 | Stop reason: HOSPADM

## 2021-08-21 PROBLEM — E87.1 HYPONATREMIA: Status: ACTIVE | Noted: 2021-08-21

## 2021-08-21 PROBLEM — E87.6 HYPOKALEMIA: Status: ACTIVE | Noted: 2021-08-21

## 2021-08-21 PROBLEM — R53.1 WEAKNESS GENERALIZED: Status: ACTIVE | Noted: 2021-08-21

## 2021-08-21 LAB
ALBUMIN SERPL-MCNC: 3.5 G/DL (ref 3.2–4.6)
ALBUMIN/GLOB SERPL: 1.2 {RATIO} (ref 1.2–3.5)
ALP SERPL-CCNC: 62 U/L (ref 50–136)
ALT SERPL-CCNC: 57 U/L (ref 12–65)
ANION GAP SERPL CALC-SCNC: 11 MMOL/L (ref 7–16)
ANION GAP SERPL CALC-SCNC: 2 MMOL/L (ref 7–16)
APPEARANCE UR: CLEAR
AST SERPL-CCNC: 42 U/L (ref 15–37)
BACTERIA URNS QL MICRO: 0 /HPF
BASOPHILS # BLD: 0.1 K/UL (ref 0–0.2)
BASOPHILS NFR BLD: 1 % (ref 0–2)
BILIRUB SERPL-MCNC: 0.6 MG/DL (ref 0.2–1.1)
BILIRUB UR QL: NEGATIVE
BUN SERPL-MCNC: 5 MG/DL (ref 8–23)
BUN SERPL-MCNC: 7 MG/DL (ref 8–23)
CALCIUM SERPL-MCNC: 8.8 MG/DL (ref 8.3–10.4)
CALCIUM SERPL-MCNC: 9.5 MG/DL (ref 8.3–10.4)
CASTS URNS QL MICRO: ABNORMAL /LPF
CHLORIDE SERPL-SCNC: 89 MMOL/L (ref 98–107)
CHLORIDE SERPL-SCNC: 99 MMOL/L (ref 98–107)
CO2 SERPL-SCNC: 24 MMOL/L (ref 21–32)
CO2 SERPL-SCNC: 32 MMOL/L (ref 21–32)
COLOR UR: YELLOW
COVID-19 RAPID TEST, COVR: NOT DETECTED
CREAT SERPL-MCNC: 0.37 MG/DL (ref 0.6–1)
CREAT SERPL-MCNC: 0.39 MG/DL (ref 0.6–1)
DIFFERENTIAL METHOD BLD: ABNORMAL
EOSINOPHIL # BLD: 0.1 K/UL (ref 0–0.8)
EOSINOPHIL NFR BLD: 2 % (ref 0.5–7.8)
EPI CELLS #/AREA URNS HPF: ABNORMAL /HPF
ERYTHROCYTE [DISTWIDTH] IN BLOOD BY AUTOMATED COUNT: 12.3 % (ref 11.9–14.6)
GLOBULIN SER CALC-MCNC: 3 G/DL (ref 2.3–3.5)
GLUCOSE SERPL-MCNC: 112 MG/DL (ref 65–100)
GLUCOSE SERPL-MCNC: 96 MG/DL (ref 65–100)
GLUCOSE UR STRIP.AUTO-MCNC: NEGATIVE MG/DL
HCT VFR BLD AUTO: 34.9 % (ref 35.8–46.3)
HGB BLD-MCNC: 11.8 G/DL (ref 11.7–15.4)
HGB UR QL STRIP: ABNORMAL
IMM GRANULOCYTES # BLD AUTO: 0.1 K/UL (ref 0–0.5)
IMM GRANULOCYTES NFR BLD AUTO: 1 % (ref 0–5)
KETONES UR QL STRIP.AUTO: 40 MG/DL
LACTATE SERPL-SCNC: 0.7 MMOL/L (ref 0.4–2)
LEUKOCYTE ESTERASE UR QL STRIP.AUTO: NEGATIVE
LIPASE SERPL-CCNC: 162 U/L (ref 73–393)
LYMPHOCYTES # BLD: 1.5 K/UL (ref 0.5–4.6)
LYMPHOCYTES NFR BLD: 23 % (ref 13–44)
MAGNESIUM SERPL-MCNC: 2.3 MG/DL (ref 1.8–2.4)
MCH RBC QN AUTO: 30.6 PG (ref 26.1–32.9)
MCHC RBC AUTO-ENTMCNC: 33.8 G/DL (ref 31.4–35)
MCV RBC AUTO: 90.6 FL (ref 79.6–97.8)
MONOCYTES # BLD: 0.6 K/UL (ref 0.1–1.3)
MONOCYTES NFR BLD: 9 % (ref 4–12)
NEUTS SEG # BLD: 4.4 K/UL (ref 1.7–8.2)
NEUTS SEG NFR BLD: 66 % (ref 43–78)
NITRITE UR QL STRIP.AUTO: NEGATIVE
NRBC # BLD: 0 K/UL (ref 0–0.2)
PH UR STRIP: 7 [PH] (ref 5–9)
PLATELET # BLD AUTO: 220 K/UL (ref 150–450)
PMV BLD AUTO: 9.1 FL (ref 9.4–12.3)
POTASSIUM SERPL-SCNC: 3.4 MMOL/L (ref 3.5–5.1)
POTASSIUM SERPL-SCNC: 4 MMOL/L (ref 3.5–5.1)
PROT SERPL-MCNC: 6.5 G/DL (ref 6.3–8.2)
PROT UR STRIP-MCNC: NEGATIVE MG/DL
RBC # BLD AUTO: 3.85 M/UL (ref 4.05–5.2)
RBC #/AREA URNS HPF: ABNORMAL /HPF
SARS-COV-2, COV2: NORMAL
SODIUM SERPL-SCNC: 124 MMOL/L (ref 136–145)
SODIUM SERPL-SCNC: 133 MMOL/L (ref 136–145)
SODIUM SERPL-SCNC: 139 MMOL/L (ref 136–145)
SODIUM SERPL-SCNC: 140 MMOL/L (ref 136–145)
SODIUM SERPL-SCNC: 140 MMOL/L (ref 136–145)
SODIUM UR-SCNC: 37 MMOL/L
SOURCE, COVRS: NORMAL
SP GR UR REFRACTOMETRY: 1 (ref 1–1.02)
TSH SERPL DL<=0.005 MIU/L-ACNC: 0.65 UIU/ML
UROBILINOGEN UR QL STRIP.AUTO: 0.2 EU/DL (ref 0.2–1)
WBC # BLD AUTO: 6.8 K/UL (ref 4.3–11.1)
WBC URNS QL MICRO: 0 /HPF

## 2021-08-21 PROCEDURE — 74011250637 HC RX REV CODE- 250/637: Performed by: FAMILY MEDICINE

## 2021-08-21 PROCEDURE — 74011250636 HC RX REV CODE- 250/636: Performed by: FAMILY MEDICINE

## 2021-08-21 PROCEDURE — 84295 ASSAY OF SERUM SODIUM: CPT

## 2021-08-21 PROCEDURE — 84443 ASSAY THYROID STIM HORMONE: CPT

## 2021-08-21 PROCEDURE — 65270000029 HC RM PRIVATE

## 2021-08-21 PROCEDURE — 74011250636 HC RX REV CODE- 250/636: Performed by: EMERGENCY MEDICINE

## 2021-08-21 PROCEDURE — 74011250637 HC RX REV CODE- 250/637: Performed by: EMERGENCY MEDICINE

## 2021-08-21 PROCEDURE — 83735 ASSAY OF MAGNESIUM: CPT

## 2021-08-21 PROCEDURE — 80048 BASIC METABOLIC PNL TOTAL CA: CPT

## 2021-08-21 PROCEDURE — 84300 ASSAY OF URINE SODIUM: CPT

## 2021-08-21 PROCEDURE — 36415 COLL VENOUS BLD VENIPUNCTURE: CPT

## 2021-08-21 PROCEDURE — 81001 URINALYSIS AUTO W/SCOPE: CPT

## 2021-08-21 PROCEDURE — 87635 SARS-COV-2 COVID-19 AMP PRB: CPT

## 2021-08-21 RX ORDER — ACETAMINOPHEN 650 MG/1
650 SUPPOSITORY RECTAL
Status: DISCONTINUED | OUTPATIENT
Start: 2021-08-21 | End: 2021-08-22 | Stop reason: HOSPADM

## 2021-08-21 RX ORDER — SODIUM CHLORIDE 0.9 % (FLUSH) 0.9 %
5-40 SYRINGE (ML) INJECTION EVERY 8 HOURS
Status: DISCONTINUED | OUTPATIENT
Start: 2021-08-21 | End: 2021-08-22 | Stop reason: HOSPADM

## 2021-08-21 RX ORDER — CHOLECALCIFEROL (VITAMIN D3) 125 MCG
5 CAPSULE ORAL
Status: DISCONTINUED | OUTPATIENT
Start: 2021-08-21 | End: 2021-08-22 | Stop reason: HOSPADM

## 2021-08-21 RX ORDER — ENOXAPARIN SODIUM 100 MG/ML
40 INJECTION SUBCUTANEOUS DAILY
Status: DISCONTINUED | OUTPATIENT
Start: 2021-08-21 | End: 2021-08-22 | Stop reason: HOSPADM

## 2021-08-21 RX ORDER — DEXTROSE MONOHYDRATE 50 MG/ML
100 INJECTION, SOLUTION INTRAVENOUS CONTINUOUS
Status: DISCONTINUED | OUTPATIENT
Start: 2021-08-21 | End: 2021-08-22

## 2021-08-21 RX ORDER — SODIUM CHLORIDE 9 MG/ML
75 INJECTION, SOLUTION INTRAVENOUS CONTINUOUS
Status: DISCONTINUED | OUTPATIENT
Start: 2021-08-21 | End: 2021-08-21

## 2021-08-21 RX ORDER — POTASSIUM CHLORIDE 20 MEQ/1
40 TABLET, EXTENDED RELEASE ORAL
Status: DISCONTINUED | OUTPATIENT
Start: 2021-08-21 | End: 2021-08-21

## 2021-08-21 RX ORDER — ACETAMINOPHEN 325 MG/1
650 TABLET ORAL
Status: DISCONTINUED | OUTPATIENT
Start: 2021-08-21 | End: 2021-08-22 | Stop reason: HOSPADM

## 2021-08-21 RX ORDER — ONDANSETRON 2 MG/ML
4 INJECTION INTRAMUSCULAR; INTRAVENOUS
Status: DISCONTINUED | OUTPATIENT
Start: 2021-08-21 | End: 2021-08-22 | Stop reason: HOSPADM

## 2021-08-21 RX ORDER — POLYETHYLENE GLYCOL 3350 17 G/17G
17 POWDER, FOR SOLUTION ORAL DAILY
Status: DISCONTINUED | OUTPATIENT
Start: 2021-08-21 | End: 2021-08-22 | Stop reason: HOSPADM

## 2021-08-21 RX ORDER — LANOLIN ALCOHOL/MO/W.PET/CERES
400 CREAM (GRAM) TOPICAL
Status: DISCONTINUED | OUTPATIENT
Start: 2021-08-21 | End: 2021-08-22 | Stop reason: HOSPADM

## 2021-08-21 RX ORDER — SODIUM CHLORIDE 0.9 % (FLUSH) 0.9 %
5-40 SYRINGE (ML) INJECTION AS NEEDED
Status: DISCONTINUED | OUTPATIENT
Start: 2021-08-21 | End: 2021-08-22 | Stop reason: HOSPADM

## 2021-08-21 RX ORDER — PROMETHAZINE HYDROCHLORIDE 25 MG/1
12.5 TABLET ORAL
Status: DISCONTINUED | OUTPATIENT
Start: 2021-08-21 | End: 2021-08-22 | Stop reason: HOSPADM

## 2021-08-21 RX ADMIN — DEXTROSE MONOHYDRATE 100 ML/HR: 5 INJECTION, SOLUTION INTRAVENOUS at 21:35

## 2021-08-21 RX ADMIN — SODIUM CHLORIDE 1000 ML: 900 INJECTION, SOLUTION INTRAVENOUS at 00:44

## 2021-08-21 RX ADMIN — Medication 10 ML: at 08:31

## 2021-08-21 RX ADMIN — Medication 5 MG: at 21:35

## 2021-08-21 RX ADMIN — POTASSIUM BICARBONATE 40 MEQ: 391 TABLET, EFFERVESCENT ORAL at 00:53

## 2021-08-21 RX ADMIN — Medication 10 ML: at 21:39

## 2021-08-21 RX ADMIN — ACETAMINOPHEN 650 MG: 325 TABLET, FILM COATED ORAL at 10:18

## 2021-08-21 RX ADMIN — Medication 400 MG: at 21:35

## 2021-08-21 RX ADMIN — Medication 5 MG: at 02:25

## 2021-08-21 RX ADMIN — Medication 10 ML: at 21:40

## 2021-08-21 RX ADMIN — SODIUM CHLORIDE 75 ML/HR: 900 INJECTION, SOLUTION INTRAVENOUS at 08:37

## 2021-08-21 NOTE — PROGRESS NOTES
SW reviewed patient's chart and conducted a baseline assessment. Discharge plan at this time is as follows:     Care Management Interventions  PCP Verified by CM: Yes  Mode of Transport at Discharge: Self  Transition of Care Consult (CM Consult): Discharge Planning  Current Support Network: Own Home  Confirm Follow Up Transport: Friends  Discharge Location  Discharge Placement: Home      *Please note that discharge plans can change throughout an inpatient admission.  Ensure that you are referring to the most recent social work/nurse case management note for current discharge plan*     Rocael Petersen, [address] [place] [address] Work   [facility]    * [email or web address]

## 2021-08-21 NOTE — PROGRESS NOTES
SW met with patient, confirmed demographic information. Patient lives alone with no nearby relatives, but has local friends. Patient reports being independent at home, does not use assistive devices to ambulate, and denies any falls. Patient witnessed by SW ambulating in the halls. States that she walks for at least 20 minutes a day and does exercises while watching TV. She states she's very active. Patient is also established with primary care and is seen regularly. No needs identified from [facility] at this time.      Sukhdev Alford LMSW    [facility]    * [email or web address]

## 2021-08-21 NOTE — ED PROVIDER NOTES
Mask was worn during the entire patient examination. Lio Sosa is a 68 y.o. female who presents to the ED with a chief complaint of fatigue. Patient states she has had about 17 months of constant fatigue and weakness as well as some myalgias and no one has been able to figure out. She reports that she had very severe weakness today was unable to walk. The weakness was bilateral.  No falls or direct injury. She still states she feels weak. She did have some report of vomiting. The history is provided by the patient. Fatigue  Associated symptoms include vomiting and nausea. Pertinent negatives include no chest pain and no headaches. Nausea   Associated symptoms include arthralgias. Pertinent negatives include no abdominal pain, no headaches and no headaches.         Past Medical History:   Diagnosis Date    Atrophic vaginitis     Colon cancer ([facility] [address] 2010    stage 1 polyps x2 removed during colonscopy    History of abnormal cervical Pap smear     S/P cervical conization 2016    IBS (irritable bowel syndrome)     Lichen sclerosus [id number]/41/6689    Osteoporosis        Past Surgical History:   Procedure Laterality Date    ENDOSCOPY, COLON, DIAGNOSTIC      polyps    HX BREAST BIOPSY      HX COLONOSCOPY  09/2020    HX GI      HX GYN      LEEP    HX GYN      colposcopy    HX GYN      [facility]    HX OTHER SURGICAL      colonscopy    [place] BREAST SURGERY PROCEDURE UNLISTED Left 1968    lumpectomy~benign         Family History:   Problem Relation Age of Onset    Cancer Brother         Brain Tumor    Hypertension Mother     Obesity Mother     Alzheimer Mother     Cancer Father         Lung    Lung Disease Father     Breast Cancer Neg Hx     Colon Cancer Neg Hx        Social History     Socioeconomic History    Marital status:      Spouse name: Not on file    Number of children: Not on file    Years of education: Not on file    Highest education level: Not on file   Occupational History    Occupation: Therapist   Tobacco Use    Smoking status: Former Smoker     Packs/day: 1.00     Years: 18.00     Pack years: 18.00     Quit date: 10/17/1993     Years since quittin.8    Smokeless tobacco: Never Used   Vaping Use    Vaping Use: Never used   Substance and Sexual Activity    Alcohol use: Yes     Comment: capful of wine every night    Drug use: No    Sexual activity: Never     Birth control/protection: None   Other Topics Concern     Service Not Asked    Blood Transfusions Not Asked    Caffeine Concern No     Comment: coffee, 3-4 times per day    Occupational Exposure Not Asked   [place] Hazards Not Asked    Sleep Concern Not Asked    Stress Concern Not Asked    Weight Concern Not Asked    Special Diet Not Asked    Back Care Not Asked    Exercise Yes     Comment: walking, aerobics, yoga, 5-6 times per week    Bike Helmet Not Asked    Seat Belt Yes     Comment: always    Self-Exams Yes     Comment: always breast exam   Social History Narrative    Abuse: Feels safe at home, no history of physical abuse, no history of sexual abuse          Social Determinants of Health     Financial Resource Strain:     Difficulty of Paying Living Expenses:    Food Insecurity:     Worried About Running Out of Food in the Last Year:     Ran Out of Food in the Last Year:    Transportation Needs:     Lack of Transportation (Medical):      Lack of Transportation (Non-Medical):    Physical Activity:     Days of Exercise per Week:     Minutes of Exercise per Session:    Stress:     Feeling of Stress :    Social Connections:     Frequency of Communication with Friends and Family:     Frequency of Social Gatherings with Friends and Family:     Attends [employer] Services:     Active Member of Clubs or Organizations:     Attends Club or Organization Meetings:     Marital Status:    Intimate Partner Violence:     Fear of Current or Ex-Partner:     Emotionally Abused:     Physically Abused:     Sexually Abused: ALLERGIES: Erythromycin, Fluocinonide, Fosamax [alendronate], and Paxil [paroxetine hcl]    Review of Systems   Constitutional: Positive for fatigue. HENT: Negative for congestion. Respiratory: Negative for apnea, chest tightness, wheezing and stridor. Cardiovascular: Negative for chest pain and palpitations. Gastrointestinal: Positive for nausea and vomiting. Negative for abdominal distention and abdominal pain. Musculoskeletal: Positive for arthralgias. Negative for back pain and neck stiffness. Skin: Negative for color change and wound. Neurological: Positive for weakness. Negative for dizziness, tremors, syncope, facial asymmetry, light-headedness and headaches. All other systems reviewed and are negative. Vitals:    08/21/21 0411 08/21/21 0511 08/21/21 0611 08/21/21 0711   BP: (!) 114/55 (!) 108/56 115/60 122/61   Pulse: 73 68 73 74   Resp: 29   20   Temp:    97.8 °F (36.6 °C)   SpO2: 97% 96%  96%   Weight:       Height:                Physical Exam  Vitals and nursing note reviewed. Constitutional:       General: She is not in acute distress. Appearance: Normal appearance. She is well-developed. She is not ill-appearing, toxic-appearing or diaphoretic. HENT:      Head: Normocephalic and atraumatic. Eyes:      General: No scleral icterus. Conjunctiva/sclera: Conjunctivae normal.   Cardiovascular:      Rate and Rhythm: Normal rate and regular rhythm. Heart sounds: No murmur heard. No friction rub. No gallop. Pulmonary:      Effort: Pulmonary effort is normal. No respiratory distress. Breath sounds: No stridor. No wheezing, rhonchi or rales. Chest:      Chest wall: No tenderness. Abdominal:      General: Abdomen is flat. Palpations: There is no mass. Tenderness: There is no abdominal tenderness. There is no guarding or rebound. Hernia: No hernia is present.    Musculoskeletal:         General: No swelling or tenderness. Normal range of motion. Cervical back: No rigidity. Skin:     General: Skin is warm. Capillary Refill: Capillary refill takes less than 2 seconds. Coloration: Skin is not jaundiced or pale. Neurological:      General: No focal deficit present. Mental Status: She is alert and oriented to person, place, and time. Mental status is at baseline. Psychiatric:         Mood and Affect: Mood normal.         Behavior: Behavior normal.          MDM  Number of Diagnoses or Management Options  Diagnosis management comments: Patient had very low sodium levels chloride and potassium are also low. When I questioned her about water intake she does drink at least a gallon of distilled water daily. She likely has delusional electrolyte abnormalities that are likely exacerbating her weakness. I have spoken to hospitalist for admission. Samantha Dickens MD; 8/21/2021 @7:50 AM Voice dictation software was used during the making of this note. This software is not perfect and grammatical and other typographical errors may be present.   This note has not been proofread for errors.  ===================================================================          Amount and/or Complexity of Data Reviewed  Clinical lab tests: reviewed and ordered  Tests in the radiology section of CPT®: ordered and reviewed           Procedures

## 2021-08-21 NOTE — H&P
[facility]  History and Physical    Patient ID:  Itzel Brandon  female  1945  [id number]    Admission Date: 8/20/2021  Chief Complaint: Weakness  Reason for Admission: Hyponatremia    ASSESSMENT & PLAN:    Active Hospital Problems    Diagnosis Date Noted    Hyponatremia 08/21/2021     Principal Problem:    Hyponatremia (8/21/2021)  IV normal saline, check sodium levels every 6 hours  Appears due to dilution, patient reports drinking about a gallon of water a day. Disposition: admit to inpatient, [facility]  Diet: Regular  VTE ppx: Lovenox   CODE STATUS: Full    Surrogate decision-maker:     HISTORY OF PRESENT ILLNESS:  Patient was discussed with the ER provider prior to seeing the patient. Patient is a 68 y.o. female with who presented to the ED with complaints of nausea, vomiting, weakness, and dizziness. She has been having these types of symptoms for about a year and a half. She states she has seen several doctors multiple times and has not found a adequate diagnosis. She states, however, in the last 2 to 3 days her symptoms have significantly worsened. She denies fever/chills, change in bowel movements, chest pain, cough, shortness of breath. She does report suprapubic pain, but no dysuria. ER work-up does show a significant hyponatremia. Patient does report drinking about a gallon of water per day. REVIEW OF SYSTEMS:  A 14 point review of systems was taken and pertinent positive and negative as per HPI. Allergies   Allergen Reactions    Erythromycin Shortness of Breath    Fluocinonide Swelling    Fosamax [Alendronate] Other (comments)     SIDE EFFECTS ONLY    Paxil [Paroxetine Hcl] Swelling     Tongue swelling         Prior to Admission Medications   Prescriptions Last Dose Informant Patient Reported? Taking? Creon 36,000-114,000- 180,000 unit cpDR capsule   Yes No   Sig: TAKE 1 CAPSULE BY MOUTH THREE TIMES DAILY WITH MEALS.  DO NOT CRUSH CHEW AND OR DIVIDE   OTHER   Yes No   Sig: DIGEST GOLD - 1 po TID with meals   Oregano Oil 1,500 mg cap   Yes No   Sig: Take  by mouth daily. VITAMIN E, DL,TOCOPHERYL ACET, (VITAMIN E, DL, ACETATE, PO)   Yes No   Sig: Take 180 mg by mouth daily. ascorbic acid (VITAMIN C) 500 mg tablet   Yes No   Sig: Take 1,000 mg by mouth two (2) times a day. cholecalciferol (VITAMIN D3) 1,000 unit tablet   Yes No   Sig: Take 2,000 Units by mouth two (2) times a day. Indications: PREVENTION OF VITAMIN D DEFICIENCY   cyanocobalamin (VITAMIN B-12) 1,000 mcg tablet   Yes No   Sig: Take 1,000 mcg by mouth daily. estradioL (ESTRACE) 0.01 % (0.1 mg/gram) vaginal cream   No No   Sig: Insert 1 g into vagina two (2) days a week.   magnesium 250 mg tab   Yes No   Sig: Take  by mouth nightly.   magnesium oxide (MAG-OX) 400 mg tablet   Yes No   Sig: Take 400 mg by mouth nightly.       Facility-Administered Medications: None       Past Medical History:   Diagnosis Date    Atrophic vaginitis     Colon cancer ([facility] [address]     stage 1 polyps x2 removed during colonscopy    History of abnormal cervical Pap smear     S/P cervical conization     IBS (irritable bowel syndrome)     Lichen sclerosus     Osteoporosis      Past Surgical History:   Procedure Laterality Date    ENDOSCOPY, COLON, DIAGNOSTIC      polyps    HX BREAST BIOPSY      HX COLONOSCOPY  2020    HX GI      HX GYN      LEEP    HX GYN      colposcopy    HX GYN      [facility]C    HX OTHER SURGICAL      colonscopy    [place] BREAST SURGERY PROCEDURE UNLISTED Left 1968    lumpectomy~benign       Social History     Tobacco Use    Smoking status: Former Smoker     Packs/day: 1.00     Years: 18.00     Pack years: 18.00     Quit date: 10/17/1993     Years since quittin.8    Smokeless tobacco: Never Used   Substance Use Topics    Alcohol use: Yes     Comment: capful of wine every night       FAMILY HISTORY:  Reviewed and non-contributory to admitting problem, unless otherwise stated in the HPI    Family History   Problem Relation Age of Onset    Cancer Brother         Brain Tumor    Hypertension Mother     Obesity Mother     Alzheimer Mother     Cancer Father         Lung    Lung Disease Father     Breast Cancer Neg Hx     Colon Cancer Neg Hx              PHYSICAL EXAM:    Patient Vitals for the past 24 hrs:   Temp Pulse Resp BP SpO2   08/21/21 0039     95 %   08/21/21 0031  82 27 (!) 140/63 95 %   08/20/21 2347 97.7 °F (36.5 °C) 87 16 123/60 97 %     Visit Vitals  BP (!) 140/63   Pulse 82   Temp 97.7 °F (36.5 °C)   Resp 27   Ht 5' 3\" (1.6 m)   Wt 51.3 kg (113 lb 1.5 oz)   SpO2 95%   BMI 20.03 kg/m²       General:    WD and WN, No apparent distress. Eyes:  PERRL; EOMI; sclera normal/non-icteric  HENT:  Normocephalic, without obvious abnormality; Oropharynx is clear with tacky mucous membranes   Resp:    Clear to auscultation bilaterally. Resp are even and unlabored  CVS/Heart: Regular rate and rhythm,  No LE edema    GI:    Soft, non-tender. Not distended. Bowel sounds normal.     Musc/SK: Muscle strength is appropriate for age/condition; No cyanosis.  No clubbing  Skin:  No obvious rash/lesions  Neurologic: CN II - XII are grossly intact - Eye exam as noted above; non focal  Psych: Alert and oriented x 4;  Judgement and insight are normal      Intake/Output last 3 shifts:  Date 08/20/21 0700 - 08/21/21 0659 08/21/21 0700 - 08/22/21 0659   Shift 6123-8135 1825-4735 24 Hour Total 5461-1385 6725-1026 24 Hour Total   INTAKE   I.V.  1000 1000        Volume (sodium chloride 0.9 % bolus infusion 1,000 mL)  1000 1000      Shift Total(mL/kg)  1000(19.5) 1000(19.5)      OUTPUT   Shift Total(mL/kg)         NET  1000 1000      Weight (kg)  51.3 51.3 51.3 51.3 51.3       Labs:  CMP:   Lab Results   Component Value Date/Time     (L) 08/20/2021 11:51 PM    K 3.4 (L) 08/20/2021 11:51 PM    CL 89 (L) 08/20/2021 11:51 PM    CO2 24 08/20/2021 11:51 PM    AGAP 11 08/20/2021 11:51 PM  (H) 08/20/2021 11:51 PM    BUN 7 (L) 08/20/2021 11:51 PM    CREA 0.39 (L) 08/20/2021 11:51 PM    GFRAA >60 08/20/2021 11:51 PM    GFRNA >60 08/20/2021 11:51 PM    CA 8.8 08/20/2021 11:51 PM    ALB 3.5 08/20/2021 11:51 PM    TBILI 0.6 08/20/2021 11:51 PM    TP 6.5 08/20/2021 11:51 PM    GLOB 3.0 08/20/2021 11:51 PM    AGRAT 1.2 08/20/2021 11:51 PM    ALT 57 08/20/2021 11:51 PM         CBC:    Lab Results   Component Value Date/Time    WBC 6.8 08/20/2021 11:51 PM    HGB 11.8 08/20/2021 11:51 PM    HCT 34.9 (L) 08/20/2021 11:51 PM     08/20/2021 11:51 PM       No results found for: INR, PTMR, PTP, PT1, PT2, INREXT    ABG:  No results found for: PH, PHI, PCO2, PCO2I, PO2, PO2I, HCO3, HCO3I, FIO2, FIO2I        No results found for: CPK, RCK1, RCK2, RCK3, RCK4, CKMB, CKNDX, CKND1, TROPT, TROIQ, BNPP, BNP    Imaging & Other Studies:  No results found.    EKG Results     Procedure 720 Value Units Date/Time    EKG (Check If Upper Abdominal Pain or symptoms of SOB, Diaphoresis, or Tachycardia) [[id number]]     Order Status: Completed           Active Problems:  Patient Active Problem List    Diagnosis Date Noted    Hyponatremia 08/21/2021    Exocrine pancreatic insufficiency 08/04/2021    Encounter for immunization 07/20/2021    Breast cancer screening 06/01/2021    Lipid screening 06/01/2021    Fatigue 06/01/2021    Elevated ALT measurement 06/01/2021    Myalgia 06/01/2021    History of abnormal cervical Pap smear     IBS (irritable bowel syndrome) 10/27/2020    Senile osteoporosis 07/17/2017    Colon polyp 10/17/2012    Colon cancer ([facility] [address] 2010         Gokul Quezada MD  [address] [facility] Service  8/21/2021 3:58 AM

## 2021-08-21 NOTE — PROGRESS NOTES
[facility]ist Progress Note     Name:  Ray Clements  Age:76 y.o. Sex:female   :  1945    MRN:  [id number]     Admit Date:  2021    Reason for Admission:  Hyponatremia [E87.1]    Assessment & Plan     Hyponatremia-on ivf. Urine sodium 37  Later on this evening sodium of 139. Fluids stopped. Repeat sodium was ordered. Hypokalemia- replace    Chronic fatigue  H/o IBS    Spoke to son later on today explained pts present medical treatment. Diet:  DIET ADULT  DVT PPx: lovenox  Code: Full Code    Dispo/Discharge Plannin-2 days    Hospital Course/Subjective:     Patient is a 68 y.o. female with who presented to the ED with complaints of nausea, vomiting, weakness, and dizziness. She has been having these types of symptoms for about a year and a half. She states she has seen several doctors multiple times and has not found a adequate diagnosis. She states, however, in the last 2 to 3 days her symptoms have significantly worsened. She denies fever/chills, change in bowel movements, chest pain, cough, shortness of breath. She does report suprapubic pain, but no dysuria. ER work-up does show a significant hyponatremia. Patient does report drinking about a gallon of water per day. Admitted for generalized weakness and hyponatremia      Subjective/24 hr Events (21):  C/o feeling weak and tired. Going on for 19 months    Review of Systems: 14 point review of systems is otherwise negative with the exception of the elements mentioned above.     Objective:     Patient Vitals for the past 24 hrs:   Temp Pulse Resp BP SpO2   21 1110 97.2 °F (36.2 °C) 70 19 (!) 108/53 98 %   21 0840 98.2 °F (36.8 °C) 73 19 (!) 133/97 100 %   21 0711 97.8 °F (36.6 °C) 74 20 122/61 96 %   21 0611  73  115/60    21 0511  68  (!) 108/56 96 %   21 0411  73 29 (!) 114/55 97 %   21 0311  70 15 (!) 105/58 97 %   21 0211  75 (!) 62 (!) 123/54 95 %   08/21/21 0200  78 14 123/61 96 %   08/21/21 0149  88 29 130/60 96 %   08/21/21 0039     95 %   08/21/21 0031  82 27 (!) 140/63 95 %   08/20/21 2347 97.7 °F (36.5 °C) 87 16 123/60 97 %     Oxygen Therapy  O2 Sat (%): 98 % (08/21/21 1110)  Pulse via Oximetry: 69 beats per minute (08/21/21 0511)  O2 Device: None (Room air) (08/21/21 0711)    Body mass index is 20.03 kg/m². Physical Exam:   General:     alert, awake, no acute distress. Well nourished / malnourished. Obese/underweight/cachetic/malnourished  HENT:   normocephalic, atraumatic. Oropharynx clear with moist mucous membranes and normal hard/soft palate  Eyes:   anicteric sclerae, moist conjunctiva, PERRL, EOMI  Neck:    supple, non-tender. Trachea midline. Lungs:   CTAB, no wheezing, rhonchi, rales  Cardiac:   RRR, Normal S1 and S2. No S3, S4 or murmurs. Abdomen:   Soft, non distended, nontender, +BS, no guarding/rebound  Extremities:   Warm, dry. No edema , pedal pulses present, no digital cyanosis  Skin:   Warn, dry, normnal turgor and texture; no rash, ulcers or nodules appreciated  Neuro:   AAOx3.  No gross focal neurological deficit,  Cranial nerves II-XII grossly intact  Psychiatric:  No anxiety, calm, cooperative      Data Review:  I have reviewed all labs, meds, and studies from the last 24 hours:    Labs:  Recent Results (from the past 24 hour(s))   CBC WITH AUTOMATED DIFF    Collection Time: 08/20/21 11:51 PM   Result Value Ref Range    WBC 6.8 4.3 - 11.1 K/uL    RBC 3.85 (L) 4.05 - 5.2 M/uL    HGB 11.8 11.7 - 15.4 g/dL    HCT 34.9 (L) 35.8 - 46.3 %    MCV 90.6 79.6 - 97.8 FL    MCH 30.6 26.1 - 32.9 PG    MCHC 33.8 31.4 - 35.0 g/dL    RDW 12.3 11.9 - 14.6 %    PLATELET 745 869 - 112 K/uL    MPV 9.1 (L) 9.4 - 12.3 FL    ABSOLUTE NRBC 0.00 0.0 - 0.2 K/uL    DF AUTOMATED      NEUTROPHILS 66 43 - 78 %    LYMPHOCYTES 23 13 - 44 %    MONOCYTES 9 4.0 - 12.0 %    EOSINOPHILS 2 0.5 - 7.8 %    BASOPHILS 1 0.0 - 2.0 %    IMMATURE GRANULOCYTES 1 0.0 - 5.0 %    ABS. NEUTROPHILS 4.4 1.7 - 8.2 K/UL    ABS. LYMPHOCYTES 1.5 0.5 - 4.6 K/UL    ABS. MONOCYTES 0.6 0.1 - 1.3 K/UL    ABS. EOSINOPHILS 0.1 0.0 - 0.8 K/UL    ABS. BASOPHILS 0.1 0.0 - 0.2 K/UL    ABS. IMM. GRANS. 0.1 0.0 - 0.5 K/UL   METABOLIC PANEL, COMPREHENSIVE    Collection Time: 08/20/21 11:51 PM   Result Value Ref Range    Sodium 124 (L) 136 - 145 mmol/L    Potassium 3.4 (L) 3.5 - 5.1 mmol/L    Chloride 89 (L) 98 - 107 mmol/L    CO2 24 21 - 32 mmol/L    Anion gap 11 7 - 16 mmol/L    Glucose 112 (H) 65 - 100 mg/dL    BUN 7 (L) 8 - 23 MG/DL    Creatinine 0.39 (L) 0.6 - 1.0 MG/DL    GFR est AA >60 >60 ml/min/1.73m2    GFR est non-AA >60 >60 ml/min/1.73m2    Calcium 8.8 8.3 - 10.4 MG/DL    Bilirubin, total 0.6 0.2 - 1.1 MG/DL    ALT (SGPT) 57 12 - 65 U/L    AST (SGOT) 42 (H) 15 - 37 U/L    Alk.  phosphatase 62 50 - 136 U/L    Protein, total 6.5 6.3 - 8.2 g/dL    Albumin 3.5 3.2 - 4.6 g/dL    Globulin 3.0 2.3 - 3.5 g/dL    A-G Ratio 1.2 1.2 - 3.5     LIPASE    Collection Time: 08/20/21 11:51 PM   Result Value Ref Range    Lipase 162 73 - 393 U/L   LACTIC ACID    Collection Time: 08/20/21 11:51 PM   Result Value Ref Range    Lactic acid 0.7 0.4 - 2.0 MMOL/L   URINALYSIS W/ RFLX MICROSCOPIC    Collection Time: 08/21/21  1:49 AM   Result Value Ref Range    Color YELLOW      Appearance CLEAR      Specific gravity 1.004 1.001 - 1.023      pH (UA) 7.0 5.0 - 9.0      Protein Negative NEG mg/dL    Glucose Negative mg/dL    Ketone 40 (A) NEG mg/dL    Bilirubin Negative NEG      Blood TRACE (A) NEG      Urobilinogen 0.2 0.2 - 1.0 EU/dL    Nitrites Negative NEG      Leukocyte Esterase Negative NEG      WBC 0 0 /hpf    RBC 0-3 0 /hpf    Epithelial cells 0-3 0 /hpf    Bacteria 0 0 /hpf    Casts 0-3 0 /lpf   SODIUM, UR, RANDOM    Collection Time: 08/21/21  1:49 AM   Result Value Ref Range    Sodium,urine random 37 MMOL/L   SARS-COV-2    Collection Time: 08/21/21  2:28 AM   Result Value Ref Range    SARS-CoV-2 SPECIMEN NOT SENT    COVID-19 RAPID TEST    Collection Time: 08/21/21  2:28 AM   Result Value Ref Range    Specimen source Nasopharyngeal      COVID-19 rapid test Not detected NOTD     METABOLIC PANEL, BASIC    Collection Time: 08/21/21  9:02 AM   Result Value Ref Range    Sodium 133 (L) 136 - 145 mmol/L    Potassium 4.0 3.5 - 5.1 mmol/L    Chloride 99 98 - 107 mmol/L    CO2 32 21 - 32 mmol/L    Anion gap 2 (L) 7 - 16 mmol/L    Glucose 96 65 - 100 mg/dL    BUN 5 (L) 8 - 23 MG/DL    Creatinine 0.37 (L) 0.6 - 1.0 MG/DL    GFR est AA >60 >60 ml/min/1.73m2    GFR est non-AA >60 >60 ml/min/1.73m2    Calcium 9.5 8.3 - 10.4 MG/DL   TSH 3RD GENERATION    Collection Time: 08/21/21  9:02 AM   Result Value Ref Range    TSH 0.648 uIU/mL   MAGNESIUM    Collection Time: 08/21/21  9:02 AM   Result Value Ref Range    Magnesium 2.3 1.8 - 2.4 mg/dL   SODIUM    Collection Time: 08/21/21 12:50 PM   Result Value Ref Range    Sodium 139 136 - 145 mmol/L       All Micro Results     Procedure Component Value Units Date/Time    COVID-19 RAPID TEST [[id number]] Collected: 08/21/21 0228    Order Status: Completed Specimen: Nasopharyngeal Updated: 08/21/21 0256     Specimen source Nasopharyngeal        COVID-19 rapid test Not detected        Comment:      The specimen is NEGATIVE for SARS-CoV-2, the novel coronavirus associated with COVID-19. A negative result does not rule out COVID-19. This test has been authorized by the FDA under an Emergency Use Authorization (EUA) for use by authorized laboratories.         Fact sheet for Healthcare Providers: ConventionUpdate.co.nz  Fact sheet for Patients: ConventionUpdate.co.nz       Methodology: Isothermal Nucleic Acid Amplification               EKG Results     Procedure 720 Value Units Date/Time    EKG (Check If Upper Abdominal Pain or symptoms of SOB, Diaphoresis, or Tachycardia) [[id number]]     Order Status: Completed           Other Studies:  No results found.    Current Meds:   Current Facility-Administered Medications   Medication Dose Route Frequency    melatonin tablet 5 mg  5 mg Oral QHS    lipase-protease-amylase (CREON 36,000) capsule 1 Capsule (Patient Supplied)  1 Capsule Oral TID WITH MEALS    magnesium oxide (MAG-OX) tablet 400 mg  400 mg Oral QHS    sodium chloride (NS) flush 5-40 mL  5-40 mL IntraVENous Q8H    sodium chloride (NS) flush 5-40 mL  5-40 mL IntraVENous PRN    acetaminophen (TYLENOL) tablet 650 mg  650 mg Oral Q6H PRN    Or    acetaminophen (TYLENOL) suppository 650 mg  650 mg Rectal Q6H PRN    polyethylene glycol (MIRALAX) packet 17 g  17 g Oral DAILY    promethazine (PHENERGAN) tablet 12.5 mg  12.5 mg Oral Q6H PRN    Or    ondansetron (ZOFRAN) injection 4 mg  4 mg IntraVENous Q6H PRN    enoxaparin (LOVENOX) injection 40 mg  40 mg SubCUTAneous DAILY    sodium chloride (NS) flush 5-10 mL  5-10 mL IntraVENous Q8H    sodium chloride (NS) flush 5-10 mL  5-10 mL IntraVENous PRN       Problem List:  Hospital Problems as of 8/21/2021 Date Reviewed: 6/21/2021        Codes Class Noted - Resolved POA    * (Principal) Hyponatremia ICD-10-CM: E87.1  ICD-9-CM: 276.1  8/21/2021 - Present Yes        Weakness generalized ICD-10-CM: R53.1  ICD-9-CM: 780.79  8/21/2021 - Present Unknown        Hypokalemia ICD-10-CM: E87.6  ICD-9-CM: 276.8  8/21/2021 - Present Unknown        IBS (irritable bowel syndrome) ICD-10-CM: K58.9  ICD-9-CM: 564.1  10/27/2020 - Present Yes    Overview Addendum 8/4/2021 10:45 AM by Leonidas Almanza MD     7/21/21 CT abd/pelvis - normal  9/17/20 colonoscopy (Dr. Marielos Stauffer) - hyperplastic polyp removed. 6/27/19 CT abdomen / pelvis - unremarkable. The patient states that she had additional extensive testing performed by her gastroenterologist including a breath test and multiple stool studies. She may have had a stool study with elevated fecal fat and has been placed on empiric trial of Creon.   F/U with Dr. Marielos Stauffer / Vida Wen as scheduled.                        Signed By: Jade Estes MD   [address] [place], [place] [address]ist Service    August 21, 2021  7:50 AM

## 2021-08-21 NOTE — ED TRIAGE NOTES
Pt presents to the ED for c/o nausea, vomiting, generalized weakness and dizziness for several months. Has been seen multiple times for the same problems.   Masked on arrival

## 2021-08-21 NOTE — PROGRESS NOTES
TRANSFER - IN REPORT:    Verbal report received from Irina RN (name) on Abel Ibanez  being received from ER (unit) for routine progression of care      Report consisted of patients Situation, Background, Assessment and   Recommendations(SBAR). Information from the following report(s) SBAR, Kardex, Intake/Output, MAR and Recent Results was reviewed with the receiving nurse. Opportunity for questions and clarification was provided. Assessment completed upon patients arrival to unit and care assumed.

## 2021-08-21 NOTE — PROGRESS NOTES
Received pt from ER in stable condition. Pt alert, oriented, ambulatory. Resp even & unlabored on room air; lungs clear; HR regular; abdomen soft with active bowel sounds; skin intact. Oriented to room, call light & meals; verbalized understanding. Pt in the recliner at this time.

## 2021-08-21 NOTE — ED NOTES
TRANSFER - OUT REPORT:    Verbal report given to [address] [place] [address] Francis Seen  being transferred to room 345 for routine progression of care       Report consisted of patients Situation, Background, Assessment and   Recommendations(SBAR). Information from the following report(s) SBAR was reviewed with the receiving nurse. Lines:   Peripheral IV 08/20/21 Posterior;Right Hand (Active)   Site Assessment Clean, dry, & intact 08/20/21 2339   Phlebitis Assessment 0 08/20/21 2339   Dressing Status Clean, dry, & intact 08/20/21 2339   Hub Color/Line Status Green 08/20/21 2339        Opportunity for questions and clarification was provided.       Patient transported with:   [place]

## 2021-08-22 VITALS
HEIGHT: 63 IN | TEMPERATURE: 98.2 F | WEIGHT: 113.1 LBS | SYSTOLIC BLOOD PRESSURE: 109 MMHG | DIASTOLIC BLOOD PRESSURE: 50 MMHG | RESPIRATION RATE: 18 BRPM | BODY MASS INDEX: 20.04 KG/M2 | HEART RATE: 80 BPM | OXYGEN SATURATION: 98 %

## 2021-08-22 LAB
ANION GAP SERPL CALC-SCNC: 3 MMOL/L (ref 7–16)
BASOPHILS # BLD: 0 K/UL (ref 0–0.2)
BASOPHILS NFR BLD: 0 % (ref 0–2)
BUN SERPL-MCNC: 5 MG/DL (ref 8–23)
CALCIUM SERPL-MCNC: 9 MG/DL (ref 8.3–10.4)
CHLORIDE SERPL-SCNC: 108 MMOL/L (ref 98–107)
CO2 SERPL-SCNC: 29 MMOL/L (ref 21–32)
CREAT SERPL-MCNC: 0.36 MG/DL (ref 0.6–1)
DIFFERENTIAL METHOD BLD: ABNORMAL
EOSINOPHIL # BLD: 0 K/UL (ref 0–0.8)
EOSINOPHIL NFR BLD: 1 % (ref 0.5–7.8)
ERYTHROCYTE [DISTWIDTH] IN BLOOD BY AUTOMATED COUNT: 12.8 % (ref 11.9–14.6)
GLUCOSE SERPL-MCNC: 123 MG/DL (ref 65–100)
HCT VFR BLD AUTO: 35.2 % (ref 35.8–46.3)
HGB BLD-MCNC: 11.9 G/DL (ref 11.7–15.4)
IMM GRANULOCYTES # BLD AUTO: 0 K/UL (ref 0–0.5)
IMM GRANULOCYTES NFR BLD AUTO: 0 % (ref 0–5)
LYMPHOCYTES # BLD: 1 K/UL (ref 0.5–4.6)
LYMPHOCYTES NFR BLD: 20 % (ref 13–44)
MCH RBC QN AUTO: 31.1 PG (ref 26.1–32.9)
MCHC RBC AUTO-ENTMCNC: 33.8 G/DL (ref 31.4–35)
MCV RBC AUTO: 91.9 FL (ref 79.6–97.8)
MONOCYTES # BLD: 0.5 K/UL (ref 0.1–1.3)
MONOCYTES NFR BLD: 10 % (ref 4–12)
NEUTS SEG # BLD: 3.3 K/UL (ref 1.7–8.2)
NEUTS SEG NFR BLD: 69 % (ref 43–78)
NRBC # BLD: 0 K/UL (ref 0–0.2)
PLATELET # BLD AUTO: 229 K/UL (ref 150–450)
PMV BLD AUTO: 8.9 FL (ref 9.4–12.3)
POTASSIUM SERPL-SCNC: 3.3 MMOL/L (ref 3.5–5.1)
RBC # BLD AUTO: 3.83 M/UL (ref 4.05–5.2)
SODIUM SERPL-SCNC: 140 MMOL/L (ref 136–145)
WBC # BLD AUTO: 4.9 K/UL (ref 4.3–11.1)

## 2021-08-22 PROCEDURE — 80048 BASIC METABOLIC PNL TOTAL CA: CPT

## 2021-08-22 PROCEDURE — 74011250637 HC RX REV CODE- 250/637: Performed by: FAMILY MEDICINE

## 2021-08-22 PROCEDURE — 36415 COLL VENOUS BLD VENIPUNCTURE: CPT

## 2021-08-22 PROCEDURE — 85025 COMPLETE CBC W/AUTO DIFF WBC: CPT

## 2021-08-22 PROCEDURE — 2709999900 HC NON-CHARGEABLE SUPPLY

## 2021-08-22 RX ORDER — POTASSIUM CHLORIDE 20 MEQ/1
40 TABLET, EXTENDED RELEASE ORAL EVERY 4 HOURS
Status: COMPLETED | OUTPATIENT
Start: 2021-08-22 | End: 2021-08-22

## 2021-08-22 RX ADMIN — POTASSIUM CHLORIDE 40 MEQ: 20 TABLET, EXTENDED RELEASE ORAL at 09:04

## 2021-08-22 RX ADMIN — POLYETHYLENE GLYCOL 3350 17 G: 17 POWDER, FOR SOLUTION ORAL at 09:04

## 2021-08-22 RX ADMIN — POTASSIUM CHLORIDE 40 MEQ: 20 TABLET, EXTENDED RELEASE ORAL at 12:16

## 2021-08-22 RX ADMIN — Medication 10 ML: at 06:28

## 2021-08-22 RX ADMIN — Medication 10 ML: at 06:27

## 2021-08-22 NOTE — DISCHARGE SUMMARY
Hospitalist Discharge Summary     Admit Date:  2021 11:32 PM   Name:  Elva Almeida   Age:  68 y.o.  :  1945   MRN:  [id number]   PCP:  Samy Huitron MD  Treatment Team: Attending Provider: Tonya Verdugo MD; Hospitalist: Tonya Verdugo MD; : Gideon Quiñonez    Problem List for this Hospitalization:  Hospital Problems as of 2021 Date Reviewed: 2021        Codes Class Noted - Resolved POA    * (Principal) Hyponatremia ICD-10-CM: E87.1  ICD-9-CM: 276.1  2021 - Present Yes        Weakness generalized ICD-10-CM: R53.1  ICD-9-CM: 780.79  2021 - Present Unknown        Hypokalemia ICD-10-CM: E87.6  ICD-9-CM: 276.8  2021 - Present Unknown        IBS (irritable bowel syndrome) ICD-10-CM: K58.9  ICD-9-CM: 564.1  10/27/2020 - Present Yes    Overview Addendum 2021 10:45 AM by Samy Huitron MD     21 CT abd/pelvis - normal  20 colonoscopy (Dr. Elliott Benz) - hyperplastic polyp removed. 19 CT abdomen / pelvis - unremarkable. The patient states that she had additional extensive testing performed by her gastroenterologist including a breath test and multiple stool studies. She may have had a stool study with elevated fecal fat and has been placed on empiric trial of Creon. F/U with Dr. Elliott Benz / Angel Gray as scheduled. Admission HPI from 2021:    Patient is a 68 y.o. female with who presented to the ED with complaints of nausea, vomiting, weakness, and dizziness. She has been having these types of symptoms for about a year and a half. She states she has seen several doctors multiple times and has not found a adequate diagnosis. She states, however, in the last 2 to 3 days her symptoms have significantly worsened. She denies fever/chills, change in bowel movements, chest pain, cough, shortness of breath. She does report suprapubic pain, but no dysuria. ER work-up does show a significant hyponatremia. Patient does report drinking about a gallon of water per day. Hospital Course:  Pt was admitted for hyponatremia. Sodium of 124. Sodium improved even before pt got enough ivf. Then fluid was changed to dextrose. Sodium was stable at 140 . Hypokalemia replaced. Pt says she feels better. pt is clinically stable for discharge. Follow up instructions below. Plan was discussed with patient. All questions answered. Patient was stable at time of discharge and was instructed to call or return if there are any concerns or recurrence of symptoms. Diagnostic Imaging/Tests:   No results found. Echocardiogram results:  No results found for this visit on 08/20/21. All Micro Results     Procedure Component Value Units Date/Time    COVID-19 RAPID TEST [[id number]] Collected: 08/21/21 0228    Order Status: Completed Specimen: Nasopharyngeal Updated: 08/21/21 0256     Specimen source Nasopharyngeal        COVID-19 rapid test Not detected        Comment:      The specimen is NEGATIVE for SARS-CoV-2, the novel coronavirus associated with COVID-19. A negative result does not rule out COVID-19. This test has been authorized by the FDA under an Emergency Use Authorization (EUA) for use by authorized laboratories.         Fact sheet for Healthcare Providers: ConventionUpdate.co.nz  Fact sheet for Patients: ConventionUpdate.co.nz       Methodology: Isothermal Nucleic Acid Amplification               Labs: Results:       BMP, Mg, Phos Recent Labs     08/22/21  0439 08/21/21  2227 08/21/21  1641 08/21/21  1250 08/21/21  0902 08/20/21  2351 08/20/21  2351    140 140   < > 133*   < > 124*   K 3.3*  --   --   --  4.0  --  3.4*   *  --   --   --  99  --  89*   CO2 29  --   --   --  32  --  24   AGAP 3*  --   --   --  2*  --  11   BUN 5*  --   --   --  5*  --  7*   CREA 0.36*  --   --   --  0.37*  --  0.39*   CA 9.0  --   --   --  9.5  --  8.8   *  --   --   -- 96  --  112*   MG  --   --   --   --  2.3  --   --     < > = values in this interval not displayed.       CBC Recent Labs     08/22/21  0439 08/20/21  2351   WBC 4.9 6.8   RBC 3.83* 3.85*   HGB 11.9 11.8   HCT 35.2* 34.9*    220   GRANS 69 66   LYMPH 20 23   EOS 1 2   MONOS 10 9   BASOS 0 1   IG 0 1   ANEU 3.3 4.4   ABL 1.0 1.5   MADDY 0.0 0.1   ABM 0.5 0.6   ABB 0.0 0.1   AIG 0.0 0.1      LFT Recent Labs     08/20/21  2351   ALT 57   AP 62   TP 6.5   ALB 3.5   GLOB 3.0   AGRAT 1.2      Cardiac Testing No results found for: BNPP, BNP, CPK, RCK1, RCK2, RCK3, RCK4, CKMB, CKNDX, CKND1, TROPT, TROIQ   Coagulation Tests No results found for: PTP, INR, APTT, INREXT   A1c Lab Results   Component Value Date/Time    Hemoglobin A1c 5.5 06/09/2021 09:10 AM      Lipid Panel Lab Results   Component Value Date/Time    Cholesterol, total 241 (H) 10/27/2020 05:09 PM    HDL Cholesterol 104 10/27/2020 05:09 PM    LDL, calculated 127 (H) 10/27/2020 05:09 PM    LDL, calculated 111 (H) 03/10/2020 01:06 PM    VLDL, calculated 10 10/27/2020 05:09 PM    VLDL, calculated 8 03/10/2020 01:06 PM    Triglyceride 60 10/27/2020 05:09 PM    CHOL/HDL Ratio 2.5 05/08/2015 11:57 AM      Thyroid Panel Lab Results   Component Value Date/Time    TSH 0.648 08/21/2021 09:02 AM    TSH 1.160 06/09/2021 09:10 AM        Most Recent UA Lab Results   Component Value Date/Time    Color YELLOW 08/21/2021 01:49 AM    Appearance CLEAR 08/21/2021 01:49 AM    Specific gravity 1.004 08/21/2021 01:49 AM    pH (UA) 7.0 08/21/2021 01:49 AM    Protein Negative 08/21/2021 01:49 AM    Glucose Negative 08/21/2021 01:49 AM    Ketone 40 (A) 08/21/2021 01:49 AM    Bilirubin Negative 08/21/2021 01:49 AM    Blood TRACE (A) 08/21/2021 01:49 AM    Urobilinogen 0.2 08/21/2021 01:49 AM    Nitrites Negative 08/21/2021 01:49 AM    Leukocyte Esterase Negative 08/21/2021 01:49 AM        Allergies   Allergen Reactions    Erythromycin Shortness of Breath    Fluocinonide Swelling    Fosamax [Alendronate] Other (comments)     SIDE EFFECTS ONLY    Paxil [Paroxetine Hcl] Swelling     Tongue swelling       Immunization History   Administered Date(s) Administered    Tdap 07/28/2019    Varicella Virus Vaccine Live 09/17/2012       All Labs from Last 24 Hrs:  Recent Results (from the past 24 hour(s))   SODIUM    Collection Time: 08/21/21 12:50 PM   Result Value Ref Range    Sodium 139 136 - 145 mmol/L   SODIUM    Collection Time: 08/21/21  4:41 PM   Result Value Ref Range    Sodium 140 136 - 145 mmol/L   SODIUM    Collection Time: 08/21/21 10:27 PM   Result Value Ref Range    Sodium 140 136 - 145 mmol/L   CBC WITH AUTOMATED DIFF    Collection Time: 08/22/21  4:39 AM   Result Value Ref Range    WBC 4.9 4.3 - 11.1 K/uL    RBC 3.83 (L) 4.05 - 5.2 M/uL    HGB 11.9 11.7 - 15.4 g/dL    HCT 35.2 (L) 35.8 - 46.3 %    MCV 91.9 79.6 - 97.8 FL    MCH 31.1 26.1 - 32.9 PG    MCHC 33.8 31.4 - 35.0 g/dL    RDW 12.8 11.9 - 14.6 %    PLATELET 238 908 - 615 K/uL    MPV 8.9 (L) 9.4 - 12.3 FL    ABSOLUTE NRBC 0.00 0.0 - 0.2 K/uL    DF AUTOMATED      NEUTROPHILS 69 43 - 78 %    LYMPHOCYTES 20 13 - 44 %    MONOCYTES 10 4.0 - 12.0 %    EOSINOPHILS 1 0.5 - 7.8 %    BASOPHILS 0 0.0 - 2.0 %    IMMATURE GRANULOCYTES 0 0.0 - 5.0 %    ABS. NEUTROPHILS 3.3 1.7 - 8.2 K/UL    ABS. LYMPHOCYTES 1.0 0.5 - 4.6 K/UL    ABS. MONOCYTES 0.5 0.1 - 1.3 K/UL    ABS. EOSINOPHILS 0.0 0.0 - 0.8 K/UL    ABS. BASOPHILS 0.0 0.0 - 0.2 K/UL    ABS. IMM.  GRANS. 0.0 0.0 - 0.5 K/UL   METABOLIC PANEL, BASIC    Collection Time: 08/22/21  4:39 AM   Result Value Ref Range    Sodium 140 136 - 145 mmol/L    Potassium 3.3 (L) 3.5 - 5.1 mmol/L    Chloride 108 (H) 98 - 107 mmol/L    CO2 29 21 - 32 mmol/L    Anion gap 3 (L) 7 - 16 mmol/L    Glucose 123 (H) 65 - 100 mg/dL    BUN 5 (L) 8 - 23 MG/DL    Creatinine 0.36 (L) 0.6 - 1.0 MG/DL    GFR est AA >60 >60 ml/min/1.73m2    GFR est non-AA >60 >60 ml/min/1.73m2    Calcium 9.0 8.3 - 10.4 MG/DL       Discharge Exam:  Patient Vitals for the past 24 hrs:   Temp Pulse Resp BP SpO2   08/22/21 0659 98.2 °F (36.8 °C) 80 18 (!) 109/50 98 %   08/22/21 0345    (!) 103/45    08/22/21 0337 98.5 °F (36.9 °C) 76 18 (!) 99/42 100 %   08/21/21 2257 97.8 °F (36.6 °C) 75 16 (!) 107/49 97 %   08/21/21 2015 98.2 °F (36.8 °C) 77 18 (!) 126/59 100 %   08/21/21 1555 98.5 °F (36.9 °C) 70 18 (!) 123/59 98 %   08/21/21 1110 97.2 °F (36.2 °C) 70 19 (!) 108/53 98 %     Oxygen Therapy  O2 Sat (%): 98 % (08/22/21 0659)  Pulse via Oximetry: 69 beats per minute (08/21/21 0511)  O2 Device: None (Room air) (08/22/21 0337)    Intake/Output Summary (Last 24 hours) at 8/22/2021 1055  Last data filed at 8/21/2021 1300  Gross per 24 hour   Intake 150 ml   Output    Net 150 ml       General:    Well nourished. Alert. No distress. Eyes:   Normal sclera. Extraocular movements intact. ENT:  Normocephalic, atraumatic. Moist mucous membranes  CV:   Regular rate and rhythm. No murmur, rub, or gallop. Lungs:  Clear to auscultation bilaterally. No wheezing, rhonchi, or rales. Abdomen: Soft, nontender, nondistended. Bowel sounds normal.   Extremities: Warm and dry. No cyanosis or edema. Neurologic: CN II-XII grossly intact. Sensation intact. Skin:     No rashes or jaundice. Psych:  Normal mood and affect. Discharge Info:   Current Discharge Medication List      START taking these medications    Details   potassium bicarb-citric acid (Effer-K) 20 mEq tablet Take 1 Tablet by mouth daily for 3 days. Qty: 3 Tablet, Refills: 0  Start date: 8/22/2021, End date: 8/25/2021         CONTINUE these medications which have NOT CHANGED    Details   Creon 36,000-114,000- 180,000 unit cpDR capsule TAKE 1 CAPSULE BY MOUTH THREE TIMES DAILY WITH MEALS. DO NOT CRUSH CHEW AND OR DIVIDE      estradioL (ESTRACE) 0.01 % (0.1 mg/gram) vaginal cream Insert 1 g into vagina two (2) days a week.   Qty: 42.5 g, Refills: 2      Oregano Oil 1,500 mg cap Take  by mouth daily.      OTHER DIGEST GOLD - 1 po TID with meals      cyanocobalamin (VITAMIN B-12) 1,000 mcg tablet Take 1,000 mcg by mouth daily. ascorbic acid (VITAMIN C) 500 mg tablet Take 1,000 mg by mouth two (2) times a day. cholecalciferol (VITAMIN D3) 1,000 unit tablet Take 2,000 Units by mouth two (2) times a day. Indications: PREVENTION OF VITAMIN D DEFICIENCY      VITAMIN E, DL,TOCOPHERYL ACET, (VITAMIN E, DL, ACETATE, PO) Take 180 mg by mouth daily. STOP taking these medications       magnesium oxide (MAG-OX) 400 mg tablet Comments:   Reason for Stopping:         magnesium 250 mg tab Comments:   Reason for Stopping:                 Disposition: home   Activity:As tolerated  Diet: ADULT DIET Regular    Follow-up Appointments   Procedures    FOLLOW UP VISIT Appointment in: One Week Follow up with pcp in a week with bmp and magnesium. Drink 2 lit of fluids per day. Follow up with pcp in a week with bmp and magnesium. Drink 2 lit of fluids per day. Standing Status:   Standing     Number of Occurrences:   1     Order Specific Question:   Appointment in     Answer: One Week         Follow-up Information     Follow up With Specialties Details Why Contact Info    Rocio Cartagena MD Internal Medicine   [address] [place] [address]  [facility]  [phone]            Time spent in patient discharge planning and coordination 25 minutes.     Signed:  Dawood Vasquez MD

## 2021-08-22 NOTE — PROGRESS NOTES
Patient is anticipated to discharge today. Plan for discharge is as follows:    Care Management Interventions  PCP Verified by CM: Yes  Mode of Transport at Discharge: Self  Transition of Care Consult (CM Consult): Discharge Planning  Current Support Network: Own Home  Confirm Follow Up Transport: Friends  Discharge Location  Discharge Placement: Home    Milestones and interventions have been entered.      Bethany Melvin LMSW    [facility]    * [email or web address]

## 2021-08-22 NOTE — PROGRESS NOTES
Problem: Falls - Risk of  Goal: *Absence of Falls  Description: Document Beth Estrada Fall Risk and appropriate interventions in the flowsheet.   Outcome: Progressing Towards Goal  Note: Fall Risk Interventions:                                Problem: Patient Education: Go to Patient Education Activity  Goal: Patient/Family Education  Outcome: Progressing Towards Goal     Problem: Hypotension  Goal: *Blood pressure within specified parameters  Outcome: Progressing Towards Goal  Goal: *Fluid volume balance  Outcome: Progressing Towards Goal  Goal: *Labs within defined limits  Outcome: Progressing Towards Goal     Problem: Patient Education: Go to Patient Education Activity  Goal: Patient/Family Education  Outcome: Progressing Towards Goal

## 2021-08-22 NOTE — PROGRESS NOTES
Discharge instructions & prescription information given to pt. Verbalized understanding. IV removed. Tip intact. Opportunity for questions provided. Pt to be taken via Lift service to her house at 1400.

## 2021-08-22 NOTE — PROGRESS NOTES
Received pt from JASWINDER Davis) in stable condition. Pt in bed resting quietly. Resp even & unlabored on room air; no acute signs of distress noted. Bed low & locked; call light in reach; no needs voiced.

## 2021-08-22 NOTE — PROGRESS NOTES
Problem: Falls - Risk of  Goal: *Absence of Falls  Description: Document Chip Armor Fall Risk and appropriate interventions in the flowsheet.   Outcome: Resolved/Met     Problem: Patient Education: Go to Patient Education Activity  Goal: Patient/Family Education  Outcome: Resolved/Met     Problem: Hypotension  Goal: *Blood pressure within specified parameters  Outcome: Resolved/Met  Goal: *Fluid volume balance  Outcome: Resolved/Met  Goal: *Labs within defined limits  Outcome: Resolved/Met     Problem: Patient Education: Go to Patient Education Activity  Goal: Patient/Family Education  Outcome: Resolved/Met

## 2021-08-23 NOTE — PROGRESS NOTES
Pt called about her dc med. Pt's pharmacy does not have it. ILIANA confirmed name of medicine, [facility].  ILIANA gave a few other options. ILIANA advised pt that the pharmacy that has medicine can call [facility] to get prescription to fill. ANGELLA Hughes states Effer-K is on backorder from the  and asked if pt can take regular potassium. ILIANA called Dr. Felipe Klinefelter who reports regular Potassium can be substituted. ILIANA called and made [address] aware of the prescription change. ILIANA also called  And made pt aware of med change and that [facility] is filling her prescription.   ANGELLA Hughes

## 2021-09-27 ENCOUNTER — TRANSCRIBE ORDER (OUTPATIENT)
Dept: SCHEDULING | Age: 76
End: 2021-09-27

## 2021-09-27 DIAGNOSIS — Z12.31 SCREENING MAMMOGRAM FOR HIGH-RISK PATIENT: Primary | ICD-10-CM

## 2021-10-26 ENCOUNTER — HOSPITAL ENCOUNTER (OUTPATIENT)
Dept: MAMMOGRAPHY | Age: 76
Discharge: HOME | End: 2021-10-26
Attending: STUDENT IN AN ORGANIZED HEALTH CARE EDUCATION/TRAINING PROGRAM
Payer: MEDICARE

## 2021-10-26 DIAGNOSIS — Z12.31 SCREENING MAMMOGRAM FOR HIGH-RISK PATIENT: ICD-10-CM

## 2021-10-26 PROCEDURE — 77063 BREAST TOMOSYNTHESIS BI: CPT

## 2022-01-13 ENCOUNTER — HOSPITAL ENCOUNTER (OUTPATIENT)
Dept: LAB | Age: 77
Discharge: HOME | End: 2022-01-13

## 2022-01-13 PROCEDURE — 88305 TISSUE EXAM BY PATHOLOGIST: CPT

## 2022-01-13 PROCEDURE — 88312 SPECIAL STAINS GROUP 1: CPT

## 2022-03-08 ENCOUNTER — TRANSCRIBE ORDER (OUTPATIENT)
Dept: SCHEDULING | Age: 77
End: 2022-03-08

## 2022-03-08 DIAGNOSIS — R10.13 EPIGASTRIC PAIN: ICD-10-CM

## 2022-03-08 DIAGNOSIS — R10.30 LOWER ABDOMINAL PAIN: ICD-10-CM

## 2022-03-08 DIAGNOSIS — R11.0 NAUSEA: ICD-10-CM

## 2022-03-08 DIAGNOSIS — K59.00 CONSTIPATION, UNSPECIFIED: Primary | ICD-10-CM

## 2022-03-18 PROBLEM — Z12.39 BREAST CANCER SCREENING: Status: ACTIVE | Noted: 2021-06-01

## 2022-03-18 PROBLEM — E87.6 HYPOKALEMIA: Status: ACTIVE | Noted: 2021-08-21

## 2022-03-18 PROBLEM — R53.83 FATIGUE: Status: ACTIVE | Noted: 2021-06-01

## 2022-03-18 PROBLEM — Z23 ENCOUNTER FOR IMMUNIZATION: Status: ACTIVE | Noted: 2021-07-20

## 2022-03-19 PROBLEM — R74.01 ELEVATED ALT MEASUREMENT: Status: ACTIVE | Noted: 2021-06-01

## 2022-03-19 PROBLEM — Z13.220 LIPID SCREENING: Status: ACTIVE | Noted: 2021-06-01

## 2022-03-19 PROBLEM — M79.10 MYALGIA: Status: ACTIVE | Noted: 2021-06-01

## 2022-03-19 PROBLEM — K58.9 IBS (IRRITABLE BOWEL SYNDROME): Status: ACTIVE | Noted: 2020-10-27

## 2022-03-19 PROBLEM — K86.81 EXOCRINE PANCREATIC INSUFFICIENCY: Status: ACTIVE | Noted: 2021-08-04

## 2022-03-20 PROBLEM — M81.0 SENILE OSTEOPOROSIS: Status: ACTIVE | Noted: 2017-07-17

## 2022-03-20 PROBLEM — E87.1 HYPONATREMIA: Status: ACTIVE | Noted: 2021-08-21

## 2022-03-20 PROBLEM — R53.1 WEAKNESS GENERALIZED: Status: ACTIVE | Noted: 2021-08-21

## 2022-04-07 ENCOUNTER — HOSPITAL ENCOUNTER (OUTPATIENT)
Dept: MAMMOGRAPHY | Age: 77
Discharge: HOME | End: 2022-04-07
Attending: NURSE PRACTITIONER
Payer: MEDICARE

## 2022-04-07 DIAGNOSIS — N64.4 PAIN OF LEFT BREAST: ICD-10-CM

## 2022-04-07 PROCEDURE — 77065 DX MAMMO INCL CAD UNI: CPT

## 2022-04-07 PROCEDURE — 76642 ULTRASOUND BREAST LIMITED: CPT

## 2022-04-07 NOTE — PROGRESS NOTES
Mammogram and US did not show anything suspicious of malignancy. Recommend routine annual mammogram screening.  Thanks

## 2022-05-23 ENCOUNTER — TELEPHONE (OUTPATIENT)
Dept: INTERNAL MEDICINE CLINIC | Facility: CLINIC | Age: 77
End: 2022-05-23

## 2022-05-23 NOTE — TELEPHONE ENCOUNTER
Pt was called, she is keeping her follow up appt. She might be going to ER; she will inform you if she does.     Thank you    Ana Shannon

## 2022-05-23 NOTE — TELEPHONE ENCOUNTER
----- Message from Brandee Bolaños DO sent at 5/23/2022  1:02 PM EDT -----  Regarding: Appointment on 5/25/22  Can we please call and clarify with this patient what GI issues she is having specifically regarding her upcoming appointment? I have seen her multiple times for the same GI symptoms and unfortunately even after discussing the case with her gastroenterologist there is not much more I can offer her short of a referral to a tertiary care center such as [address] [place] [address]. If it is a new/changing/acute GI issue then I am happy to see her. I just wanted to save her an appointment if it was for similar chronic issues as I have no further recommendations to offer her apart from what she is already doing. Thanks!     Saundra Hicks

## 2022-05-23 NOTE — TELEPHONE ENCOUNTER
I advise if patient's symptoms are worsening to keep follow up. If unable to tolerate even liquids or symptoms worsen prior to appointment, I recommend urgent care or ER evaluation.

## 2022-05-23 NOTE — TELEPHONE ENCOUNTER
Pt was called, it is the same issue as you and the patient have discussed but it has gotten worse. She unable to eat; sick, no energy and feel week. She is not vomiting at all. She understand if you want to pass her along to the gastroenterologist which she will be seeing next month.  Please advise    Thank you    Mikey Bello

## 2022-05-25 ENCOUNTER — NURSE ONLY (OUTPATIENT)
Dept: INTERNAL MEDICINE CLINIC | Facility: CLINIC | Age: 77
End: 2022-05-25

## 2022-05-25 ENCOUNTER — OFFICE VISIT (OUTPATIENT)
Dept: INTERNAL MEDICINE CLINIC | Facility: CLINIC | Age: 77
End: 2022-05-25
Payer: MEDICARE

## 2022-05-25 VITALS
TEMPERATURE: 97.3 F | DIASTOLIC BLOOD PRESSURE: 84 MMHG | SYSTOLIC BLOOD PRESSURE: 128 MMHG | OXYGEN SATURATION: 98 % | WEIGHT: 117 LBS | HEART RATE: 99 BPM | HEIGHT: 63 IN | BODY MASS INDEX: 20.73 KG/M2

## 2022-05-25 DIAGNOSIS — R10.9 CHRONIC ABDOMINAL PAIN: Primary | ICD-10-CM

## 2022-05-25 DIAGNOSIS — G89.29 CHRONIC ABDOMINAL PAIN: ICD-10-CM

## 2022-05-25 DIAGNOSIS — R00.0 RACING HEART BEAT: ICD-10-CM

## 2022-05-25 DIAGNOSIS — G89.29 CHRONIC ABDOMINAL PAIN: Primary | ICD-10-CM

## 2022-05-25 DIAGNOSIS — R10.9 CHRONIC ABDOMINAL PAIN: ICD-10-CM

## 2022-05-25 PROCEDURE — 1123F ACP DISCUSS/DSCN MKR DOCD: CPT | Performed by: STUDENT IN AN ORGANIZED HEALTH CARE EDUCATION/TRAINING PROGRAM

## 2022-05-25 PROCEDURE — 1036F TOBACCO NON-USER: CPT | Performed by: STUDENT IN AN ORGANIZED HEALTH CARE EDUCATION/TRAINING PROGRAM

## 2022-05-25 PROCEDURE — 99215 OFFICE O/P EST HI 40 MIN: CPT | Performed by: STUDENT IN AN ORGANIZED HEALTH CARE EDUCATION/TRAINING PROGRAM

## 2022-05-25 PROCEDURE — 1090F PRES/ABSN URINE INCON ASSESS: CPT | Performed by: STUDENT IN AN ORGANIZED HEALTH CARE EDUCATION/TRAINING PROGRAM

## 2022-05-25 PROCEDURE — 93000 ELECTROCARDIOGRAM COMPLETE: CPT | Performed by: STUDENT IN AN ORGANIZED HEALTH CARE EDUCATION/TRAINING PROGRAM

## 2022-05-25 PROCEDURE — G8420 CALC BMI NORM PARAMETERS: HCPCS | Performed by: STUDENT IN AN ORGANIZED HEALTH CARE EDUCATION/TRAINING PROGRAM

## 2022-05-25 PROCEDURE — G8427 DOCREV CUR MEDS BY ELIG CLIN: HCPCS | Performed by: STUDENT IN AN ORGANIZED HEALTH CARE EDUCATION/TRAINING PROGRAM

## 2022-05-25 PROCEDURE — G8400 PT W/DXA NO RESULTS DOC: HCPCS | Performed by: STUDENT IN AN ORGANIZED HEALTH CARE EDUCATION/TRAINING PROGRAM

## 2022-05-25 RX ORDER — BLACK COHOSH ROOT EXTRACT 80 MG
1 CAPSULE ORAL 2 TIMES DAILY
COMMUNITY
End: 2022-08-18 | Stop reason: SDUPTHER

## 2022-05-25 RX ORDER — OCTISALATE, AVOBENZONE, HOMOSALATE, AND OCTOCRYLENE 29.4; 29.4; 49; 25.48 MG/ML; MG/ML; MG/ML; MG/ML
1 LOTION TOPICAL DAILY
COMMUNITY

## 2022-05-25 RX ORDER — HYOSCYAMINE SULFATE 0.12 MG/1
1 TABLET SUBLINGUAL EVERY 8 HOURS PRN
Qty: 30 EACH | Refills: 0 | Status: SHIPPED | OUTPATIENT
Start: 2022-05-25 | End: 2022-08-18

## 2022-05-25 ASSESSMENT — ENCOUNTER SYMPTOMS
SHORTNESS OF BREATH: 1
BLOOD IN STOOL: 0
ABDOMINAL PAIN: 1
ANAL BLEEDING: 0
TROUBLE SWALLOWING: 1
NAUSEA: 0
VOMITING: 0

## 2022-05-25 NOTE — PROGRESS NOTES
Brittany Duarte (:  1945) is a 68 y.o. female,Established patient, here for evaluation of the following chief complaint(s):  GI Problem (Pt c/o abd pain for 3 days from Sat-Mon and generalized weakness and fatigue. )         ASSESSMENT/PLAN:  1. Chronic abdominal pain  Normal labs from 2021 include DANIS, anti-smooth muscle antibody, anti-tTG, SPEP, CK level, hepatitis panel, cortisol level, thyroid function tests  Suspect patient symptoms likely due to functional motility disorder/IBS  Colonoscopy on 2020 with hyperplastic polyp and diverticulosis  EGD from earlier this year with some gastritis and gastric polyps  CT scan from  unremarkable  Prior right upper quadrant ultrasound from  unremarkable however will recheck. If no evidence of gallstones may consider HIDA scan to rule out biliary dyskinesia  Could also consider testing for small intestinal bacterial overgrowth syndrome and/or H. pylori breath test but will defer to gastroenterology  Symptoms likely also compounded by anxiety  I have discussed case with patient's gastroenterologist Dr. Callie Figueredo who agrees that symptoms are likely functional in nature. Could consider referral to [address] or tertiary center for further evaluation should symptoms occur or worsen  Have previously discussed with patient that with functional motility disorders it is likely she may have periodic exacerbations or fluctuations that we would need to treat supportively  Patient currently taking FD guard, align probiotic and acidophilus probiotic (capsule forms with patient chewing them up). Also taking supplement for tinnitus but states symptoms began prior to using this supplement. Advised to take only align probiotic or acidophilus but not both  Start trial of sublingual Levsin  Patient with appointment with gastroenterology at the end of .   Encouraged her to contact their office today to see if a sooner appointment is available  Maintain adequate hydration (patient drinking juice and distilled water, encouraged incorporation of electrolyte beverages)  Given history of hyponatremia we will check electrolytes. We will also check CBC    - Hyoscyamine Sulfate SL (LEVSIN/SL) 0.125 MG SUBL; Place 1 tablet under the tongue every 8 hours as needed (abdominal cramping)  Dispense: 30 each; Refill: 0  - US GALLBLADDER RUQ; Future  - CBC with Auto Differential; Future  - Comprehensive Metabolic Panel; Future    2. Racing heart beat  Likely secondary to pain and anxiety  No abnormalities on cardiopulmonary exam today  EKG in office today shows NSR, normal axis, T wave inversion in aVL but no other acute ST segment or T wave abnormalities    - EKG 12 Lead; Future  - EKG 12 Lead        Subjective   SUBJECTIVE/OBJECTIVE:  Patient is a 71-year-old  female who presents to the office today for worsening abdominal symptoms. Patient has longstanding history for several months of chronic intermittent abdominal pain with generalized malaise and decreased appetite. Most recently occurred over the weekend with worsening generalized abdominal pain described as burning with some cramping and bloating. Try to stay hydrated. However after day or so this decided not to eat anything just soup which she was able to tolerate. Denies vomiting, melena, medic easier. Does have some pill dysphagia at baseline and does not like to take prescription medications, does take align over-the-counter probiotic and FD guard versus IBgard but states she chews up the capsules or opens them up. Has undergone extensive testing with GI in the past with no clear etiology. I have also spoken with patient's former gastroenterologist who agree that symptoms are likely functional in nature.   Patient states during this most recent episode she was feeling shortness of breath with sensation of heart racing but believes it was attributed to anxiety and stress. Review of Systems   Constitutional: Positive for appetite change (Decreased) and fatigue. HENT: Positive for trouble swallowing (Chronic with pills). Respiratory: Positive for shortness of breath (Short-lived). Cardiovascular: Positive for palpitations (Short-lived). Gastrointestinal: Positive for abdominal pain. Negative for anal bleeding, blood in stool, nausea and vomiting. Positive for loose stool   Neurological: Positive for weakness (Generalized) and light-headedness (Transient). Objective   Physical Exam  Vitals reviewed. Constitutional:       General: She is not in acute distress. Appearance: Normal appearance. She is not ill-appearing, toxic-appearing or diaphoretic. HENT:      Head: Normocephalic and atraumatic. Mouth/Throat:      Mouth: Mucous membranes are moist.   Eyes:      General:         Right eye: No discharge. Left eye: No discharge. Extraocular Movements: Extraocular movements intact. Cardiovascular:      Rate and Rhythm: Normal rate and regular rhythm. Heart sounds: No murmur heard. No friction rub. No gallop. Pulmonary:      Effort: Pulmonary effort is normal. No respiratory distress. Breath sounds: No wheezing, rhonchi or rales. Abdominal:      General: Bowel sounds are normal.      Palpations: Abdomen is soft. There is no mass. Tenderness: There is no abdominal tenderness. There is no guarding. Skin:     General: Skin is warm and dry. Coloration: Skin is not jaundiced. Neurological:      Mental Status: She is alert. Cranial Nerves: No cranial nerve deficit. Sensory: No sensory deficit. Motor: No weakness (Strength 4/5 and symmetric in all 4 extremities). Psychiatric:         Attention and Perception: Attention normal.         Mood and Affect: Affect normal. Mood is anxious. Affect is not tearful. Speech: Speech normal. Speech is not rapid and pressured or slurred. Behavior: Behavior normal. Behavior is not agitated, aggressive or combative. Behavior is cooperative. Thought Content: Thought content normal.         Cognition and Memory: Cognition normal.            On this date 5/25/2022 I have spent 53 minutes reviewing previous notes, test results and face to face with the patient discussing the diagnosis and importance of compliance with the treatment plan as well as documenting on the day of the visit. An electronic signature was used to authenticate this note.     --Ramiro Mills, DO

## 2022-05-26 LAB
ALBUMIN SERPL-MCNC: 4.1 G/DL (ref 3.2–4.6)
ALBUMIN/GLOB SERPL: 1.5 {RATIO} (ref 1.2–3.5)
ALP SERPL-CCNC: 66 U/L (ref 50–136)
ALT SERPL-CCNC: 33 U/L (ref 12–65)
ANION GAP SERPL CALC-SCNC: 3 MMOL/L (ref 7–16)
AST SERPL-CCNC: 24 U/L (ref 15–37)
BASOPHILS # BLD: 0.1 K/UL (ref 0–0.2)
BASOPHILS NFR BLD: 1 % (ref 0–2)
BILIRUB SERPL-MCNC: 0.5 MG/DL (ref 0.2–1.1)
BUN SERPL-MCNC: 12 MG/DL (ref 8–23)
CALCIUM SERPL-MCNC: 10 MG/DL (ref 8.3–10.4)
CHLORIDE SERPL-SCNC: 107 MMOL/L (ref 98–107)
CO2 SERPL-SCNC: 32 MMOL/L (ref 21–32)
CREAT SERPL-MCNC: 0.6 MG/DL (ref 0.6–1)
DIFFERENTIAL METHOD BLD: ABNORMAL
EOSINOPHIL # BLD: 0.1 K/UL (ref 0–0.8)
EOSINOPHIL NFR BLD: 1 % (ref 0.5–7.8)
ERYTHROCYTE [DISTWIDTH] IN BLOOD BY AUTOMATED COUNT: 13.6 % (ref 11.9–14.6)
GLOBULIN SER CALC-MCNC: 2.7 G/DL (ref 2.3–3.5)
GLUCOSE SERPL-MCNC: 89 MG/DL (ref 65–100)
HCT VFR BLD AUTO: 43.5 % (ref 35.8–46.3)
HGB BLD-MCNC: 13.7 G/DL (ref 11.7–15.4)
IMM GRANULOCYTES # BLD AUTO: 0 K/UL (ref 0–0.5)
IMM GRANULOCYTES NFR BLD AUTO: 0 % (ref 0–5)
LYMPHOCYTES # BLD: 1.4 K/UL (ref 0.5–4.6)
LYMPHOCYTES NFR BLD: 29 % (ref 13–44)
MCH RBC QN AUTO: 30.5 PG (ref 26.1–32.9)
MCHC RBC AUTO-ENTMCNC: 31.5 G/DL (ref 31.4–35)
MCV RBC AUTO: 96.9 FL (ref 79.6–97.8)
MONOCYTES # BLD: 0.6 K/UL (ref 0.1–1.3)
MONOCYTES NFR BLD: 13 % (ref 4–12)
NEUTS SEG # BLD: 2.7 K/UL (ref 1.7–8.2)
NEUTS SEG NFR BLD: 56 % (ref 43–78)
NRBC # BLD: 0 K/UL (ref 0–0.2)
PLATELET # BLD AUTO: 280 K/UL (ref 150–450)
PMV BLD AUTO: 9.9 FL (ref 9.4–12.3)
POTASSIUM SERPL-SCNC: 4 MMOL/L (ref 3.5–5.1)
PROT SERPL-MCNC: 6.8 G/DL (ref 6.3–8.2)
RBC # BLD AUTO: 4.49 M/UL (ref 4.05–5.2)
SODIUM SERPL-SCNC: 142 MMOL/L (ref 136–145)
WBC # BLD AUTO: 4.9 K/UL (ref 4.3–11.1)

## 2022-06-10 ENCOUNTER — HOSPITAL ENCOUNTER (OUTPATIENT)
Dept: ULTRASOUND IMAGING | Age: 77
Discharge: HOME | End: 2022-06-13

## 2022-06-10 DIAGNOSIS — G89.29 CHRONIC ABDOMINAL PAIN: ICD-10-CM

## 2022-06-10 DIAGNOSIS — R10.9 CHRONIC ABDOMINAL PAIN: ICD-10-CM

## 2022-07-06 ENCOUNTER — TELEPHONE (OUTPATIENT)
Dept: INTERNAL MEDICINE CLINIC | Facility: CLINIC | Age: 77
End: 2022-07-06

## 2022-07-06 NOTE — TELEPHONE ENCOUNTER
If patient believes her symptoms are getting worse including dizziness and weakness I would recommend urgent care or ER evaluation.

## 2022-07-06 NOTE — TELEPHONE ENCOUNTER
Pt situation has gotten worse, She is feeling dizzy, very weak all the time. Taking tylenol and it comes her some.  Please advise     Thank you    Jossy Guajardo

## 2022-07-24 NOTE — PROGRESS NOTES
Nicolas Feldman (:  1945) is a 68 y.o. female,Established patient, here for evaluation of the following chief complaint(s):  Abdominal Pain (Dizzy, nausea when she gets up in the morning)         ASSESSMENT/PLAN:  1. Chronic abdominal pain  Normal labs from 2021 include DANIS, anti-smooth muscle antibody, anti-tTG, SPEP, CK level, hepatitis panel, cortisol level, thyroid function tests  Suspect patient symptoms likely due to functional motility disorder/IBS compounded by possible anxiety and depression  Colonoscopy on 2020 with hyperplastic polyp and diverticulosis  EGD from earlier this year with some gastritis and gastric polyps  CT scan from  unremarkable  Right upper quadrant ultrasound on 6/10/2022 negative for gallstones or biliary tree obstruction  Discussed potential for HIDA scan to rule out biliary dyskinesia  Low suspicion for gastroparesis  Could also consider testing for small intestinal bacterial overgrowth syndrome and/or H. pylori breath test but will defer to gastroenterology  I have discussed case with patient's gastroenterologist Dr. Brandon Wisdom who agrees that symptoms are likely functional in nature. Could consider referral to [address] or tertiary center for further evaluation should symptoms occur or worsen  Have previously discussed with patient that with functional motility disorders it is likely she may have periodic exacerbations or fluctuations that we would need to treat supportively  Currently on FD guard, probiotic  Prescribe trial of sublingual Levsin at last visit  Gastroenterology notes reviewed. Patient agreeable to referral to tertiary center. Will discuss with GI and place referral order    2.  Dizziness  Doubt middle ear etiology  Likely in part due to patient's decreased caloric intake given above GI issues  Orthostatic vital signs in office today weakly positive (systolic blood pressure went from 130 supine to 118 standing)  EKG in office today shows NSR, normal axis, T wave inversion in aVL, V2 but no other acute ST segment or T wave abnormalities  Nonfocal neurological exam, hold off neuroimaging  Given new onset with bouts of near syncope and subjective feeling of heart beating and/or palpitations refer to cardiology for possible Holter monitor and further evaluation    - EKG 12 Lead; Future  - EKG 12 Lead  - [address]      No follow-ups on file. Subjective   SUBJECTIVE/OBJECTIVE:  Patient is a 26-year-old  female who presents to the office today for dizziness and continued abdominal pain. Abdominal pain is a longstanding issue associated with intermittent nausea, decreased appetite for close to a year. Has undergone multiple blood tests, imaging studies and endoscopic evaluation without definitive etiology. Patient states despite eating less over this timeframe she continues to gain weight. Currently takes a variety of over-the-counter aids including stomach ease tea, vitamins B12, C, D, FD guard, probiotic. States dizziness has been intermittent for the past 3 weeks, not seemingly triggered by anything in particular such as position changes. Does feel as if her heartbeat is more prominent. Denies vertigo but does have episodes of feeling off balance with near syncope. No chest pain, shortness of breath. Has taken over-the-counter meclizine as recommended by her pharmacist with some temporary improvement. Review of Systems   Constitutional:  Positive for fatigue. HENT:  Positive for tinnitus (Chronic). Negative for hearing loss. Respiratory:  Negative for shortness of breath. Cardiovascular:  Positive for palpitations. Negative for chest pain. Gastrointestinal:  Positive for abdominal pain and nausea. Negative for vomiting. Neurological:  Positive for dizziness, weakness (Generalized) and light-headedness. Negative for syncope and numbness.         Denies paresthesias        Objective Physical Exam  Vitals reviewed. Constitutional:       General: She is not in acute distress. Appearance: She is not ill-appearing, toxic-appearing or diaphoretic. HENT:      Head: Normocephalic and atraumatic. Right Ear: Tympanic membrane, ear canal and external ear normal. There is no impacted cerumen. Left Ear: Ear canal and external ear normal.      Ears:      Comments: Not impacted but partially obstructing cerumen in left canal however portion of TM visualized without abnormality     Mouth/Throat:      Mouth: Mucous membranes are moist.   Eyes:      General: No visual field deficit. Right eye: No discharge. Left eye: No discharge. Extraocular Movements: Extraocular movements intact. Comments: Visual fields grossly intact and symmetric bilaterally   Neck:      Vascular: No carotid bruit. Cardiovascular:      Rate and Rhythm: Normal rate and regular rhythm. Heart sounds: No murmur heard. No friction rub. No gallop. Pulmonary:      Effort: Pulmonary effort is normal. No respiratory distress. Breath sounds: No wheezing, rhonchi or rales. Abdominal:      General: Bowel sounds are normal.      Palpations: Abdomen is soft. Tenderness: There is abdominal tenderness in the periumbilical area. There is no guarding or rebound. Negative signs include McBurney's sign and psoas sign. Skin:     General: Skin is warm and dry. Coloration: Skin is not jaundiced. Neurological:      Mental Status: She is alert. Mental status is at baseline. Cranial Nerves: No cranial nerve deficit, dysarthria or facial asymmetry. Sensory: Sensation is intact. No sensory deficit. Motor: No weakness (Muscle strength 5/5 and symmetric in all 4 extremities), tremor or pronator drift. Coordination: Finger-Nose-Finger Test normal.   Psychiatric:         Attention and Perception: Attention normal.         Mood and Affect: Mood is depressed.  Affect is not tearful. Speech: Speech normal. Speech is not rapid and pressured or slurred. Behavior: Behavior normal. Behavior is not agitated, aggressive or combative. Behavior is cooperative. Thought Content: Thought content normal.         Cognition and Memory: Cognition normal.          On this date 7/26/2022 I have spent 39 minutes reviewing previous notes, test results and face to face with the patient discussing the diagnosis and importance of compliance with the treatment plan as well as documenting on the day of the visit. An electronic signature was used to authenticate this note.     --Lydia Muse, DO

## 2022-07-26 ENCOUNTER — OFFICE VISIT (OUTPATIENT)
Dept: INTERNAL MEDICINE CLINIC | Facility: CLINIC | Age: 77
End: 2022-07-26
Payer: MEDICARE

## 2022-07-26 VITALS
SYSTOLIC BLOOD PRESSURE: 130 MMHG | HEIGHT: 63 IN | DIASTOLIC BLOOD PRESSURE: 70 MMHG | TEMPERATURE: 98.4 F | HEART RATE: 92 BPM | OXYGEN SATURATION: 99 % | BODY MASS INDEX: 21.05 KG/M2 | WEIGHT: 118.8 LBS

## 2022-07-26 DIAGNOSIS — R10.9 CHRONIC ABDOMINAL PAIN: Primary | ICD-10-CM

## 2022-07-26 DIAGNOSIS — R42 DIZZINESS: ICD-10-CM

## 2022-07-26 DIAGNOSIS — G89.29 CHRONIC ABDOMINAL PAIN: Primary | ICD-10-CM

## 2022-07-26 PROCEDURE — 1090F PRES/ABSN URINE INCON ASSESS: CPT | Performed by: STUDENT IN AN ORGANIZED HEALTH CARE EDUCATION/TRAINING PROGRAM

## 2022-07-26 PROCEDURE — 93000 ELECTROCARDIOGRAM COMPLETE: CPT | Performed by: STUDENT IN AN ORGANIZED HEALTH CARE EDUCATION/TRAINING PROGRAM

## 2022-07-26 PROCEDURE — 1036F TOBACCO NON-USER: CPT | Performed by: STUDENT IN AN ORGANIZED HEALTH CARE EDUCATION/TRAINING PROGRAM

## 2022-07-26 PROCEDURE — 1123F ACP DISCUSS/DSCN MKR DOCD: CPT | Performed by: STUDENT IN AN ORGANIZED HEALTH CARE EDUCATION/TRAINING PROGRAM

## 2022-07-26 PROCEDURE — G8400 PT W/DXA NO RESULTS DOC: HCPCS | Performed by: STUDENT IN AN ORGANIZED HEALTH CARE EDUCATION/TRAINING PROGRAM

## 2022-07-26 PROCEDURE — G8420 CALC BMI NORM PARAMETERS: HCPCS | Performed by: STUDENT IN AN ORGANIZED HEALTH CARE EDUCATION/TRAINING PROGRAM

## 2022-07-26 PROCEDURE — 99214 OFFICE O/P EST MOD 30 MIN: CPT | Performed by: STUDENT IN AN ORGANIZED HEALTH CARE EDUCATION/TRAINING PROGRAM

## 2022-07-26 PROCEDURE — G8427 DOCREV CUR MEDS BY ELIG CLIN: HCPCS | Performed by: STUDENT IN AN ORGANIZED HEALTH CARE EDUCATION/TRAINING PROGRAM

## 2022-07-26 ASSESSMENT — PATIENT HEALTH QUESTIONNAIRE - PHQ9
SUM OF ALL RESPONSES TO PHQ QUESTIONS 1-9: 1
2. FEELING DOWN, DEPRESSED OR HOPELESS: 1
SUM OF ALL RESPONSES TO PHQ QUESTIONS 1-9: 1
1. LITTLE INTEREST OR PLEASURE IN DOING THINGS: 0
SUM OF ALL RESPONSES TO PHQ9 QUESTIONS 1 & 2: 1

## 2022-07-26 ASSESSMENT — ENCOUNTER SYMPTOMS
ABDOMINAL PAIN: 1
NAUSEA: 1
VOMITING: 0
SHORTNESS OF BREATH: 0

## 2022-07-29 ENCOUNTER — TELEPHONE (OUTPATIENT)
Dept: INTERNAL MEDICINE CLINIC | Facility: CLINIC | Age: 77
End: 2022-07-29

## 2022-07-29 NOTE — TELEPHONE ENCOUNTER
----- Message from Zohreh Kaiser sent at 7/29/2022  3:06 PM EDT -----  Subject: Message to Provider    QUESTIONS  Information for Provider? Patient is having issue with scheduling a   cardiology test in [facility]. Patient was advice that she can not sit   up appointment in [facility] office. She need a call back with next   steps she can take.  ---------------------------------------------------------------------------  --------------  Layla Fry INFO  [phone]; OK to leave message on voicemail  ---------------------------------------------------------------------------  --------------  SCRIPT ANSWERS  Relationship to Patient?  Self

## 2022-08-01 NOTE — TELEPHONE ENCOUNTER
Pt advised  Per Dr Dheeraj Weaver   Please have patient contact the cardiologist's office to see if she can set up an appointment at one of their other offices. With regards to her specialty GI referral, I have not yet had an opportunity to talk to her GI but will do sothis week upon my return.

## 2022-08-08 ENCOUNTER — TELEPHONE (OUTPATIENT)
Dept: INTERNAL MEDICINE CLINIC | Facility: CLINIC | Age: 77
End: 2022-08-08

## 2022-08-08 PROBLEM — I95.1 ORTHOSTATIC INTOLERANCE: Status: ACTIVE | Noted: 2022-08-08

## 2022-08-18 ENCOUNTER — INITIAL CONSULT (OUTPATIENT)
Dept: CARDIOLOGY CLINIC | Age: 77
End: 2022-08-18
Payer: MEDICARE

## 2022-08-18 VITALS
DIASTOLIC BLOOD PRESSURE: 72 MMHG | HEIGHT: 63 IN | BODY MASS INDEX: 21.12 KG/M2 | SYSTOLIC BLOOD PRESSURE: 138 MMHG | WEIGHT: 119.2 LBS | HEART RATE: 84 BPM

## 2022-08-18 DIAGNOSIS — E43 UNSPECIFIED SEVERE PROTEIN-CALORIE MALNUTRITION (HCC): ICD-10-CM

## 2022-08-18 DIAGNOSIS — I95.1 ORTHOSTATIC INTOLERANCE: Primary | ICD-10-CM

## 2022-08-18 DIAGNOSIS — R53.1 WEAKNESS: ICD-10-CM

## 2022-08-18 DIAGNOSIS — R06.09 DOE (DYSPNEA ON EXERTION): ICD-10-CM

## 2022-08-18 DIAGNOSIS — I95.1 ORTHOSTATIC INTOLERANCE: ICD-10-CM

## 2022-08-18 LAB
ALBUMIN SERPL-MCNC: 4.1 G/DL (ref 3.2–4.6)
ALBUMIN/GLOB SERPL: 1.5 {RATIO} (ref 1.2–3.5)
ALP SERPL-CCNC: 79 U/L (ref 50–136)
ALT SERPL-CCNC: 29 U/L (ref 12–65)
AST SERPL-CCNC: 24 U/L (ref 15–37)
BILIRUB DIRECT SERPL-MCNC: 0.1 MG/DL
BILIRUB SERPL-MCNC: 0.4 MG/DL (ref 0.2–1.1)
GLOBULIN SER CALC-MCNC: 2.8 G/DL (ref 2.3–3.5)
MAGNESIUM SERPL-MCNC: 2.7 MG/DL (ref 1.8–2.4)
PREALB SERPL-MCNC: 14.6 MG/DL (ref 18–35.7)
PROT SERPL-MCNC: 6.9 G/DL (ref 6.3–8.2)
TSH W FREE THYROID IF ABNORMAL: 0.62 UIU/ML (ref 0.36–3.74)

## 2022-08-18 PROCEDURE — 1036F TOBACCO NON-USER: CPT | Performed by: INTERNAL MEDICINE

## 2022-08-18 PROCEDURE — G8400 PT W/DXA NO RESULTS DOC: HCPCS | Performed by: INTERNAL MEDICINE

## 2022-08-18 PROCEDURE — 1123F ACP DISCUSS/DSCN MKR DOCD: CPT | Performed by: INTERNAL MEDICINE

## 2022-08-18 PROCEDURE — G8420 CALC BMI NORM PARAMETERS: HCPCS | Performed by: INTERNAL MEDICINE

## 2022-08-18 PROCEDURE — G8428 CUR MEDS NOT DOCUMENT: HCPCS | Performed by: INTERNAL MEDICINE

## 2022-08-18 PROCEDURE — 99204 OFFICE O/P NEW MOD 45 MIN: CPT | Performed by: INTERNAL MEDICINE

## 2022-08-18 PROCEDURE — 1090F PRES/ABSN URINE INCON ASSESS: CPT | Performed by: INTERNAL MEDICINE

## 2022-08-18 RX ORDER — MECLIZINE HYDROCHLORIDE CHEWABLE TABLETS 25 MG/1
25 TABLET, CHEWABLE ORAL 3 TIMES DAILY PRN
COMMUNITY

## 2022-08-18 RX ORDER — ST. JOHN'S WORT 300 MG
CAPSULE ORAL
COMMUNITY

## 2022-08-18 ASSESSMENT — ENCOUNTER SYMPTOMS
ABDOMINAL PAIN: 0
STRIDOR: 0
NAIL CHANGES: 0
EYE PAIN: 0
COUGH: 0
APHONIA: 0

## 2022-08-18 NOTE — PROGRESS NOTES
[address], [address] [employer] [facility], [address] [place] [address]  PHONE: [phone]    SUBJECTIVE:   Solange Nicole is a 68 y.o. female 1945   seen for a consultation visit regarding the following:     Chief Complaint   Patient presents with    Consultation     Dizziness              Consultation is requested by [unfilled] for evaluation of Consultation (Dizziness)   . History of present illness: 68 y.o. female patient with chronic abdominal pain orthostatic intolerance described previously. Episodes of near syncope. Patient denied history of recent steroid treatments. Originally  from [facility]. Taught music. 2020 sx began lost brother and close family friend. Dizziness. Difficulty  getting up in AM severe weakness   Has had hoarseness. Previous professional mirza. Severe fatigue. Anorexia eating toast apple for lunch and soup. Lack of perspiration. No ptsosi or diplopia. Cardiac History:  7/26/2022 EKG sinus rhythm poor R wave progression  Assessment:  Orthostatic intolerance   Evaluation of autonomic function with tilt table study  Comprehensive work-up for fatigue and weakness including myasthenia panel  Likely poor nutrition due to anorexia  Additional component of anxiety depression possible    MALLOY     History of tobacco use  Weight loss fatigue noted  Chest radiograph plan    Past Medical History, Past Surgical History, Family history, Social History, and Medications were all reviewed with the patient today and updated as necessary.        Allergies   Allergen Reactions    Erythromycin Shortness Of Breath    Alendronate Other (See Comments)     SIDE EFFECTS ONLY    Fluocinonide Swelling    Paroxetine Hcl Swelling     Tongue swelling     Past Medical History:   Diagnosis Date    Atrophic vaginitis     Colon polyp     History of abnormal cervical Pap smear     S/P cervical conization 2016    Hyponatremia     IBS (irritable bowel syndrome)     Lichen sclerosus 8/75/0653 Osteoporosis      Past Surgical History:   Procedure Laterality Date    BREAST BIOPSY      BREAST SURGERY  1968    lumpectomy    COLONOSCOPY  2020    COLONOSCOPY      polyps    GI      GYN      colposcopy    GYN      [facility]    GYN      LEEP    OTHER SURGICAL HISTORY      colonscopy     Family History   Problem Relation Age of Onset    Colon Cancer Neg Hx     Breast Cancer Neg Hx     Lung Disease Father     Cancer Father         Lung    Alzheimer's Disease Mother     Obesity Mother     Cancer Brother         Brain Tumor    Hypertension Mother      Social History     Tobacco Use    Smoking status: Former     Packs/day: 1.00     Types: Cigarettes     Quit date: 10/17/1993     Years since quittin.8    Smokeless tobacco: Never   Substance Use Topics    Alcohol use: Not Currently       ROS:    Review of Systems   Constitutional: Negative for fever. HENT:  Negative for stridor. Eyes:  Negative for pain. Cardiovascular:  Negative for chest pain. Respiratory:  Negative for cough. Endocrine: Negative for cold intolerance. Skin:  Negative for nail changes. Musculoskeletal:  Negative for arthritis. Gastrointestinal:  Negative for abdominal pain. Genitourinary:  Negative for dysuria. Neurological:  Negative for aphonia. Psychiatric/Behavioral:  Negative for altered mental status. Allergic/Immunologic: Negative for hives. PHYSICAL EXAM:   /72   Pulse 84   Ht 5' 3\" (1.6 m)   Wt 119 lb 3.2 oz (54.1 kg)   BMI 21.12 kg/m²      Physical Exam  Vitals reviewed. HENT:      Head: Normocephalic. Right Ear: External ear normal.      Left Ear: External ear normal.      Nose: Nose normal.   Eyes:      General: No scleral icterus. Pulmonary:      Effort: Pulmonary effort is normal.   Abdominal:      General: There is no distension. Musculoskeletal:      Cervical back: Neck supple. Skin:     General: Skin is warm. Neurological:      Mental Status: She is alert.  Mental status is at

## 2022-08-23 LAB
ACHR AB SER-SCNC: <0.03 NMOL/L (ref 0–0.24)
ACHR BLOCK AB SER-ACNC: 12 % (ref 0–25)
ACHR MOD AB/ACHR TOTAL SFR SER: NORMAL %

## 2022-08-25 NOTE — PROGRESS NOTES
Patient pre-assessment complete for [facility] scheduled for tilt table, arrival time 1000. Patient verified using . Patient instructed to bring a list of all home medications on the day of procedure. NPO status reinforced. Patient verbalizes understanding of all instructions & denies any questions at this time.

## 2022-08-26 ENCOUNTER — HOSPITAL ENCOUNTER (OUTPATIENT)
Dept: CARDIAC CATH/INVASIVE PROCEDURES | Age: 77
Discharge: HOME | End: 2022-08-26
Attending: INTERNAL MEDICINE | Admitting: INTERNAL MEDICINE
Payer: MEDICARE

## 2022-08-26 VITALS
DIASTOLIC BLOOD PRESSURE: 61 MMHG | SYSTOLIC BLOOD PRESSURE: 120 MMHG | TEMPERATURE: 97.9 F | HEART RATE: 74 BPM | RESPIRATION RATE: 16 BRPM | OXYGEN SATURATION: 98 %

## 2022-08-26 DIAGNOSIS — I95.1 ORTHOSTATIC INTOLERANCE: ICD-10-CM

## 2022-08-26 LAB
ANION GAP SERPL CALC-SCNC: 2 MMOL/L (ref 7–16)
BUN SERPL-MCNC: 9 MG/DL (ref 8–23)
CALCIUM SERPL-MCNC: 9.7 MG/DL (ref 8.3–10.4)
CHLORIDE SERPL-SCNC: 108 MMOL/L (ref 98–107)
CO2 SERPL-SCNC: 31 MMOL/L (ref 21–32)
CREAT SERPL-MCNC: 0.6 MG/DL (ref 0.6–1)
ERYTHROCYTE [DISTWIDTH] IN BLOOD BY AUTOMATED COUNT: 12.9 % (ref 11.9–14.6)
GLUCOSE SERPL-MCNC: 93 MG/DL (ref 65–100)
HCT VFR BLD AUTO: 42.7 % (ref 35.8–46.3)
HGB BLD-MCNC: 13.6 G/DL (ref 11.7–15.4)
MAGNESIUM SERPL-MCNC: 2.4 MG/DL (ref 1.8–2.4)
MCH RBC QN AUTO: 30.6 PG (ref 26.1–32.9)
MCHC RBC AUTO-ENTMCNC: 31.9 G/DL (ref 31.4–35)
MCV RBC AUTO: 96.2 FL (ref 79.6–97.8)
NRBC # BLD: 0 K/UL (ref 0–0.2)
PLATELET # BLD AUTO: 267 K/UL (ref 150–450)
PMV BLD AUTO: 9.3 FL (ref 9.4–12.3)
POTASSIUM SERPL-SCNC: 3.6 MMOL/L (ref 3.5–5.1)
RBC # BLD AUTO: 4.44 M/UL (ref 4.05–5.2)
SODIUM SERPL-SCNC: 141 MMOL/L (ref 136–145)
WBC # BLD AUTO: 4.4 K/UL (ref 4.3–11.1)

## 2022-08-26 PROCEDURE — 93660 TILT TABLE EVALUATION: CPT

## 2022-08-26 PROCEDURE — 85027 COMPLETE CBC AUTOMATED: CPT

## 2022-08-26 PROCEDURE — 80048 BASIC METABOLIC PNL TOTAL CA: CPT

## 2022-08-26 PROCEDURE — 93660 TILT TABLE EVALUATION: CPT | Performed by: INTERNAL MEDICINE

## 2022-08-26 PROCEDURE — 83735 ASSAY OF MAGNESIUM: CPT

## 2022-08-26 NOTE — DISCHARGE INSTRUCTIONS
After your tilt table test    Be sure someone else drives you home. You have no restrictions:   Return to your previous diet. Increase your fluid & salt intake   Return to your previous activities. Continue current medications       Call your doctor if:    You have trouble breathing all of a sudden. You have chest pain or any pain that spreads to your neck, jaw, or arms. You have questions or concerns.   You have a fever greater than 101°F.  You have increase in dizzy or fainting spells

## 2022-08-27 LAB
25(OH)D3 SERPL-MCNC: 41.5 NG/ML (ref 30–100)
CORTIS AM PEAK SERPL-MCNC: 18.7 UG/DL (ref 7–25)

## 2022-08-29 ENCOUNTER — TELEPHONE (OUTPATIENT)
Dept: INTERNAL MEDICINE CLINIC | Facility: CLINIC | Age: 77
End: 2022-08-29

## 2022-08-29 NOTE — TELEPHONE ENCOUNTER
Pt sts she saw Cardiologist and doesn't know what they injected her with but she has not felt good since. Pt sts she is sleeping all the time has no energy and wants to talk to doctor.

## 2022-09-01 ENCOUNTER — TELEPHONE (OUTPATIENT)
Dept: INTERNAL MEDICINE CLINIC | Facility: CLINIC | Age: 77
End: 2022-09-01

## 2022-09-01 NOTE — TELEPHONE ENCOUNTER
Pt advised  Per Dr Teddy Archer   I am not sure what she is referring to. I see where she had a tilt table test and an ultrasound of her heart, neither of which I think involved an IV medication. Unfortunately many of these symptoms are chronic and I am not sure what else I can offer her in terms of additional testing.

## 2022-09-01 NOTE — TELEPHONE ENCOUNTER
----- Message from Odilon Marin DO sent at 9/1/2022  1:42 PM EDT -----  Regarding: GI recommendations  Please call patient's GI office ([facility]). Patient has been having chronic abdominal pain for over a year with normal workup. I have previously discussed her case with Dr. Jerry loaiza at that office who agreed patient may benefit from GI evaluation at a tertiary care center like [address]. I was wondering if Dr. Destiny Castaneda or whoever is regularly seeing the patient had any recommendations on what provider to refer her to over there? Thanks!     Karen Lee

## 2022-09-01 NOTE — TELEPHONE ENCOUNTER
LM with Gastro Per Dr Johanna Andrews   and they will have nurse call me back. Please call patient's GI office ([facility]). Patient has been having chronic abdominal pain for over a year with normal workup. I have previously discussed her case with Dr. Ingrid Ni over at that office who agreed patient may benefit from GI evaluation at a tertiary care center like [address]. I was wondering if Dr. Blanka Peres or whoever is regularly seeing the patient had any recommendations on what provider to refer her to over there?

## 2022-09-02 NOTE — TELEPHONE ENCOUNTER
Gastro returned my call and Maya sts they don't have one particular doctor they use referral goes to general intake and then gastro and can take up to 4 weeks to hear from them.

## 2022-09-09 ENCOUNTER — TELEPHONE (OUTPATIENT)
Dept: INTERNAL MEDICINE CLINIC | Facility: CLINIC | Age: 77
End: 2022-09-09

## 2022-09-09 DIAGNOSIS — R10.9 CHRONIC ABDOMINAL PAIN: Primary | ICD-10-CM

## 2022-09-09 DIAGNOSIS — G89.29 CHRONIC ABDOMINAL PAIN: Primary | ICD-10-CM

## 2022-09-09 NOTE — TELEPHONE ENCOUNTER
Previously discussed with patient placing referral to [address] given patient with chronic abdominal pain and as of yet unremarkable but extensive work-up.   Also has been discussed with patient's gastroenterologist    Orders Placed This Encounter    External Referral To Gastroenterology     Referral Priority:   Routine     Referral Type:   Eval and Treat     Referral Reason:   Specialty Services Required     Requested Specialty:   Gastroenterology     Number of Visits Requested:   1

## 2022-09-15 ENCOUNTER — OFFICE VISIT (OUTPATIENT)
Dept: INTERNAL MEDICINE CLINIC | Facility: CLINIC | Age: 77
End: 2022-09-15
Payer: MEDICARE

## 2022-09-15 VITALS
WEIGHT: 117 LBS | HEIGHT: 63 IN | OXYGEN SATURATION: 97 % | DIASTOLIC BLOOD PRESSURE: 70 MMHG | TEMPERATURE: 97 F | SYSTOLIC BLOOD PRESSURE: 160 MMHG | HEART RATE: 100 BPM | BODY MASS INDEX: 20.73 KG/M2

## 2022-09-15 DIAGNOSIS — R64 EMACIATED (HCC): Primary | ICD-10-CM

## 2022-09-15 DIAGNOSIS — R63.39 AVERSION TO FOOD: ICD-10-CM

## 2022-09-15 DIAGNOSIS — E44.0 MODERATE PROTEIN-CALORIE MALNUTRITION (HCC): ICD-10-CM

## 2022-09-15 PROCEDURE — 1090F PRES/ABSN URINE INCON ASSESS: CPT | Performed by: NURSE PRACTITIONER

## 2022-09-15 PROCEDURE — G8420 CALC BMI NORM PARAMETERS: HCPCS | Performed by: NURSE PRACTITIONER

## 2022-09-15 PROCEDURE — 99214 OFFICE O/P EST MOD 30 MIN: CPT | Performed by: NURSE PRACTITIONER

## 2022-09-15 PROCEDURE — 1036F TOBACCO NON-USER: CPT | Performed by: NURSE PRACTITIONER

## 2022-09-15 PROCEDURE — 1123F ACP DISCUSS/DSCN MKR DOCD: CPT | Performed by: NURSE PRACTITIONER

## 2022-09-15 PROCEDURE — G8400 PT W/DXA NO RESULTS DOC: HCPCS | Performed by: NURSE PRACTITIONER

## 2022-09-15 PROCEDURE — G8427 DOCREV CUR MEDS BY ELIG CLIN: HCPCS | Performed by: NURSE PRACTITIONER

## 2022-09-15 ASSESSMENT — PATIENT HEALTH QUESTIONNAIRE - PHQ9
SUM OF ALL RESPONSES TO PHQ QUESTIONS 1-9: 0
SUM OF ALL RESPONSES TO PHQ QUESTIONS 1-9: 0
SUM OF ALL RESPONSES TO PHQ9 QUESTIONS 1 & 2: 0
1. LITTLE INTEREST OR PLEASURE IN DOING THINGS: 0
2. FEELING DOWN, DEPRESSED OR HOPELESS: 0
SUM OF ALL RESPONSES TO PHQ QUESTIONS 1-9: 0
SUM OF ALL RESPONSES TO PHQ QUESTIONS 1-9: 0

## 2022-09-15 ASSESSMENT — ENCOUNTER SYMPTOMS
CONSTIPATION: 0
COUGH: 0
ABDOMINAL PAIN: 0
EYE DISCHARGE: 0
SINUS PAIN: 0
COLOR CHANGE: 0
SHORTNESS OF BREATH: 0
EYE ITCHING: 0
DIARRHEA: 0
NAUSEA: 0
EYE PAIN: 0
BACK PAIN: 0
APNEA: 0
EYE REDNESS: 0
ABDOMINAL DISTENTION: 0

## 2022-09-15 NOTE — PROGRESS NOTES
[unfilled]  [address] [place], [place] [id number]      PROGRESS NOTE    SUBJECTIVE:   Lenna Crigler is a 68 y.o. female seen for a follow up visit regarding the following chief complaint:     Chief Complaint   Patient presents with    Dizziness     Possible anxiety, not eating, weakness       Dizziness  Pertinent negatives include no abdominal pain, arthralgias, chest pain, chills, congestion, coughing, fatigue, fever, headaches, joint swelling, myalgias, nausea, neck pain, rash or weakness. Patient presents with concerns of fatigue, anxiety and light-headedness. This is an ongoing problem with worsening symptoms over the past 3 weeks. She went to the ED 2 weeks ago and was diagnosed with malnutrition and orthostatic intolerance. She has major food aversion and eats less than 1000 calories daily as food \"upsets her stomach. \" She denies emesis. Follows GI. Dizziness with sitting to standing. Denies syncope. Following cardiology; negative tilt table; TTE    Current Outpatient Medications   Medication Sig Dispense Refill    Homeopathic Products (ARNICARE ARTHRITIS PO) Take by mouth      Apoaequorin (PREVAGEN PO) Take by mouth      Turmeric 450 MG CAPS Take by mouth      St Johns Wort 300 MG CAPS Take by mouth      Flax OIL Take by mouth 1 Tablespoon before bedtime      UNABLE TO FIND Stomach Ease Tea      UNABLE TO FIND Take 1 capsule by mouth daily Tinnitus 911 + Vit B12      Probiotic Product (ALIGN) 4 MG CAPS Take 1 capsule by mouth daily      Richard Oil-Levomenthol (FDGARD PO) Take 3 capsules by mouth in the morning, at noon, and at bedtime Commiskey oil- 50 mg, l-Menthol- 41.5 mg  1 capsule before breakfast, lunch and dinner.       Acetaminophen 325 MG CAPS Take 1 tablet by mouth as needed      ascorbic acid (VITAMIN C) 500 MG tablet Take 1,000 mg by mouth 2 times daily      vitamin D (CHOLECALCIFEROL) 25 MCG (1000 UT) TABS tablet Take 2,000 Units by mouth 2 times daily cyanocobalamin 1000 MCG tablet Take 1,000 mcg by mouth every 7 days      UNABLE TO FIND CBD Gummies 300mg (Patient not taking: Reported on 9/15/2022)      meclizine (ANTIVERT) 25 MG CHEW Take 25 mg by mouth 3 times daily as needed (Patient not taking: Reported on 9/15/2022)       No current facility-administered medications for this visit. Allergies   Allergen Reactions    Erythromycin Shortness Of Breath    Alendronate Other (See Comments)     SIDE EFFECTS ONLY    Fluocinonide Swelling    Paroxetine Hcl Swelling     Tongue swelling       Past Medical History:   Diagnosis Date    Atrophic vaginitis     Colon polyp     History of abnormal cervical Pap smear     S/P cervical conization     Hyponatremia     IBS (irritable bowel syndrome)     Lichen sclerosus     Osteoporosis      Past Surgical History:   Procedure Laterality Date    BREAST BIOPSY      BREAST SURGERY  1968    lumpectomy    COLONOSCOPY  2020    COLONOSCOPY      polyps    GI      GYN      colposcopy    GYN      [facility]    GYN      LEEP    OTHER SURGICAL HISTORY      colonscopy     Family History   Problem Relation Age of Onset    Colon Cancer Neg Hx     Breast Cancer Neg Hx     Lung Disease Father     Cancer Father         Lung    Alzheimer's Disease Mother     Obesity Mother     Cancer Brother         Brain Tumor    Hypertension Mother      Social History     Tobacco Use    Smoking status: Former     Packs/day: 1.00     Types: Cigarettes     Quit date: 10/17/1993     Years since quittin.9    Smokeless tobacco: Never   Substance Use Topics    Alcohol use: Not Currently       Review of Systems   Constitutional:  Negative for activity change, appetite change, chills, fatigue and fever. HENT:  Negative for congestion, ear pain and sinus pain. Eyes:  Negative for pain, discharge, redness and itching. Respiratory:  Negative for apnea, cough and shortness of breath.     Cardiovascular:  Negative for chest pain, palpitations and leg swelling. Gastrointestinal:  Negative for abdominal distention, abdominal pain, constipation, diarrhea and nausea. Endocrine: Negative for cold intolerance and heat intolerance. Genitourinary:  Negative for difficulty urinating, dysuria, enuresis and urgency. Musculoskeletal:  Negative for arthralgias, back pain, joint swelling, myalgias and neck pain. Skin:  Negative for color change and rash. Neurological:  Positive for dizziness. Negative for weakness and headaches. Psychiatric/Behavioral:  Negative for behavioral problems and sleep disturbance. The patient is not nervous/anxious. OBJECTIVE:  BP (!) 160/70 (Site: Left Upper Arm, Position: Sitting, Cuff Size: Small Adult)   Pulse 100   Temp 97 °F (36.1 °C) (Temporal)   Ht 5' 3\" (1.6 m)   Wt 117 lb (53.1 kg)   SpO2 97%   BMI 20.73 kg/m²      Physical Exam  Constitutional:       General: She is not in acute distress. Appearance: She is ill-appearing. She is not toxic-appearing. Comments: emaciated   Cardiovascular:      Rate and Rhythm: Normal rate and regular rhythm. Pulmonary:      Effort: Pulmonary effort is normal. No respiratory distress. Skin:     General: Skin is warm and dry. Neurological:      Mental Status: She is alert. Mental status is at baseline. Psychiatric:         Mood and Affect: Mood normal.         Behavior: Behavior normal.         Thought Content: Thought content normal.         ASSESSMENT and PLAN  Anabella Narvaez was seen today for dizziness. Diagnoses and all orders for this visit:    [facility])  -     [facility] Nutrition Counseling    Aversion to food  -     [facility] Outpatient Nutrition Counseling    Moderate protein-calorie malnutrition ([address]  -     [facility] Outpatient Nutrition Counseling    We discussed following with infusion center for banana bag and encouraged to eat more protein rich foods and calorie goal 2000 minimum.      Follow with nutritionist for consult-she agrees to this today. Greater than 50% of this 35 min visit was spent counseling the patient about test results, prognosis, importance of compliance, education about disease process, benefits of medications, instructions for management of acute symptoms, and follow up plans. Follow-up and Dispositions    Return for referrals as ordered. return as scheduled. Nicki Harrison NP, APRN - CNP

## 2022-09-17 ENCOUNTER — HOSPITAL ENCOUNTER (OUTPATIENT)
Dept: INFUSION THERAPY | Age: 77
Discharge: HOME | End: 2022-09-17
Payer: MEDICARE

## 2022-09-17 VITALS
HEART RATE: 84 BPM | DIASTOLIC BLOOD PRESSURE: 74 MMHG | WEIGHT: 116.4 LBS | RESPIRATION RATE: 18 BRPM | BODY MASS INDEX: 20.62 KG/M2 | TEMPERATURE: 98.2 F | OXYGEN SATURATION: 97 % | SYSTOLIC BLOOD PRESSURE: 127 MMHG

## 2022-09-17 PROCEDURE — 96366 THER/PROPH/DIAG IV INF ADDON: CPT

## 2022-09-17 PROCEDURE — 2580000003 HC RX 258: Performed by: NURSE PRACTITIONER

## 2022-09-17 PROCEDURE — 6360000002 HC RX W HCPCS: Performed by: NURSE PRACTITIONER

## 2022-09-17 PROCEDURE — 2500000003 HC RX 250 WO HCPCS: Performed by: NURSE PRACTITIONER

## 2022-09-17 PROCEDURE — 96365 THER/PROPH/DIAG IV INF INIT: CPT

## 2022-09-17 RX ORDER — SODIUM CHLORIDE 9 MG/ML
INJECTION, SOLUTION INTRAVENOUS ONCE
Status: DISCONTINUED | OUTPATIENT
Start: 2022-09-17 | End: 2022-09-17 | Stop reason: SDUPTHER

## 2022-09-17 RX ORDER — SODIUM CHLORIDE 0.9 % (FLUSH) 0.9 %
5-40 SYRINGE (ML) INJECTION PRN
Status: ACTIVE | OUTPATIENT
Start: 2022-09-17 | End: 2022-09-17

## 2022-09-17 RX ORDER — 0.9 % SODIUM CHLORIDE 0.9 %
1000 INTRAVENOUS SOLUTION INTRAVENOUS ONCE
Status: COMPLETED | OUTPATIENT
Start: 2022-09-17 | End: 2022-09-17

## 2022-09-17 RX ADMIN — SODIUM CHLORIDE, PRESERVATIVE FREE 10 ML: 5 INJECTION INTRAVENOUS at 09:10

## 2022-09-17 RX ADMIN — FOLIC ACID: 5 INJECTION, SOLUTION INTRAMUSCULAR; INTRAVENOUS; SUBCUTANEOUS at 09:30

## 2022-09-17 RX ADMIN — SODIUM CHLORIDE, PRESERVATIVE FREE 10 ML: 5 INJECTION INTRAVENOUS at 11:30

## 2022-09-17 RX ADMIN — SODIUM CHLORIDE 1000 ML: 9 INJECTION, SOLUTION INTRAVENOUS at 09:10

## 2022-09-17 NOTE — PROGRESS NOTES
Arrived to the [facility]. IVF & IV banana bag completed. Patient tolerated without difficulty. Any issues or concerns during appointment: None. Patient aware of next infusion appointment on 09/18 (date) at 80 (time). Patient instructed to call provider with temperature of 100.4 or greater or nausea/vomiting/ diarrhea or pain not controlled by medications  Discharged ambulatory.

## 2022-09-19 ENCOUNTER — HOSPITAL ENCOUNTER (EMERGENCY)
Dept: GENERAL RADIOLOGY | Age: 77
Discharge: HOME | End: 2022-09-22
Payer: MEDICARE

## 2022-09-19 ENCOUNTER — HOSPITAL ENCOUNTER (EMERGENCY)
Age: 77
Discharge: HOME | End: 2022-09-19
Attending: EMERGENCY MEDICINE
Payer: MEDICARE

## 2022-09-19 VITALS
WEIGHT: 116 LBS | OXYGEN SATURATION: 96 % | SYSTOLIC BLOOD PRESSURE: 142 MMHG | RESPIRATION RATE: 18 BRPM | HEIGHT: 63 IN | BODY MASS INDEX: 20.55 KG/M2 | TEMPERATURE: 98.4 F | HEART RATE: 81 BPM | DIASTOLIC BLOOD PRESSURE: 84 MMHG

## 2022-09-19 DIAGNOSIS — R42 LIGHTHEADEDNESS: ICD-10-CM

## 2022-09-19 DIAGNOSIS — R53.83 FATIGUE, UNSPECIFIED TYPE: Primary | ICD-10-CM

## 2022-09-19 LAB
ALBUMIN SERPL-MCNC: 3.8 G/DL (ref 3.2–4.6)
ALBUMIN/GLOB SERPL: 1.2 {RATIO} (ref 1.2–3.5)
ALP SERPL-CCNC: 72 U/L (ref 50–130)
ALT SERPL-CCNC: 29 U/L (ref 12–65)
ANION GAP SERPL CALC-SCNC: 4 MMOL/L (ref 4–13)
AST SERPL-CCNC: 27 U/L (ref 15–37)
BASOPHILS # BLD: 0 K/UL (ref 0–0.2)
BASOPHILS NFR BLD: 1 % (ref 0–2)
BILIRUB SERPL-MCNC: 0.4 MG/DL (ref 0.2–1.1)
BUN SERPL-MCNC: 8 MG/DL (ref 8–23)
CALCIUM SERPL-MCNC: 9.5 MG/DL (ref 8.3–10.4)
CHLORIDE SERPL-SCNC: 109 MMOL/L (ref 101–110)
CO2 SERPL-SCNC: 31 MMOL/L (ref 21–32)
CREAT SERPL-MCNC: 0.56 MG/DL (ref 0.6–1)
DIFFERENTIAL METHOD BLD: ABNORMAL
EKG ATRIAL RATE: 76 BPM
EKG DIAGNOSIS: NORMAL
EKG P AXIS: 93 DEGREES
EKG P-R INTERVAL: 142 MS
EKG Q-T INTERVAL: 390 MS
EKG QRS DURATION: 110 MS
EKG QTC CALCULATION (BAZETT): 438 MS
EKG R AXIS: 81 DEGREES
EKG T AXIS: 85 DEGREES
EKG VENTRICULAR RATE: 76 BPM
EOSINOPHIL # BLD: 0.1 K/UL (ref 0–0.8)
EOSINOPHIL NFR BLD: 1 % (ref 0.5–7.8)
ERYTHROCYTE [DISTWIDTH] IN BLOOD BY AUTOMATED COUNT: 13.2 % (ref 11.9–14.6)
GLOBULIN SER CALC-MCNC: 3.2 G/DL (ref 2.3–3.5)
GLUCOSE SERPL-MCNC: 89 MG/DL (ref 65–100)
HCT VFR BLD AUTO: 41.1 % (ref 35.8–46.3)
HGB BLD-MCNC: 13.2 G/DL (ref 11.7–15.4)
IMM GRANULOCYTES # BLD AUTO: 0 K/UL (ref 0–0.5)
IMM GRANULOCYTES NFR BLD AUTO: 0 % (ref 0–5)
LYMPHOCYTES # BLD: 1.2 K/UL (ref 0.5–4.6)
LYMPHOCYTES NFR BLD: 27 % (ref 13–44)
MCH RBC QN AUTO: 30.5 PG (ref 26.1–32.9)
MCHC RBC AUTO-ENTMCNC: 32.1 G/DL (ref 31.4–35)
MCV RBC AUTO: 94.9 FL (ref 79.6–97.8)
MONOCYTES # BLD: 0.5 K/UL (ref 0.1–1.3)
MONOCYTES NFR BLD: 10 % (ref 4–12)
NEUTS SEG # BLD: 2.8 K/UL (ref 1.7–8.2)
NEUTS SEG NFR BLD: 61 % (ref 43–78)
NRBC # BLD: 0 K/UL (ref 0–0.2)
PLATELET # BLD AUTO: 244 K/UL (ref 150–450)
PMV BLD AUTO: 9 FL (ref 9.4–12.3)
POTASSIUM SERPL-SCNC: 3.4 MMOL/L (ref 3.5–5.1)
PROT SERPL-MCNC: 7 G/DL (ref 6.3–8.2)
RBC # BLD AUTO: 4.33 M/UL (ref 4.05–5.2)
SARS-COV-2 RDRP RESP QL NAA+PROBE: NOT DETECTED
SODIUM SERPL-SCNC: 144 MMOL/L (ref 136–145)
SOURCE: NORMAL
WBC # BLD AUTO: 4.7 K/UL (ref 4.3–11.1)

## 2022-09-19 PROCEDURE — 71045 X-RAY EXAM CHEST 1 VIEW: CPT

## 2022-09-19 PROCEDURE — 85025 COMPLETE CBC W/AUTO DIFF WBC: CPT

## 2022-09-19 PROCEDURE — 99285 EMERGENCY DEPT VISIT HI MDM: CPT

## 2022-09-19 PROCEDURE — 93005 ELECTROCARDIOGRAM TRACING: CPT | Performed by: EMERGENCY MEDICINE

## 2022-09-19 PROCEDURE — 96360 HYDRATION IV INFUSION INIT: CPT

## 2022-09-19 PROCEDURE — 2580000003 HC RX 258: Performed by: PHYSICIAN ASSISTANT

## 2022-09-19 PROCEDURE — 87635 SARS-COV-2 COVID-19 AMP PRB: CPT

## 2022-09-19 PROCEDURE — 80053 COMPREHEN METABOLIC PANEL: CPT

## 2022-09-19 RX ORDER — 0.9 % SODIUM CHLORIDE 0.9 %
1000 INTRAVENOUS SOLUTION INTRAVENOUS
Status: COMPLETED | OUTPATIENT
Start: 2022-09-19 | End: 2022-09-19

## 2022-09-19 RX ADMIN — SODIUM CHLORIDE 1000 ML: 9 INJECTION, SOLUTION INTRAVENOUS at 15:56

## 2022-09-19 ASSESSMENT — PAIN - FUNCTIONAL ASSESSMENT: PAIN_FUNCTIONAL_ASSESSMENT: 0-10

## 2022-09-19 ASSESSMENT — ENCOUNTER SYMPTOMS
SORE THROAT: 0
RHINORRHEA: 0
CHEST TIGHTNESS: 0
COUGH: 0
NAUSEA: 0
VOMITING: 0
BACK PAIN: 0
DIARRHEA: 0
EYE REDNESS: 0
ABDOMINAL PAIN: 0
SHORTNESS OF BREATH: 0
ABDOMINAL DISTENTION: 0

## 2022-09-19 ASSESSMENT — PAIN DESCRIPTION - PAIN TYPE: TYPE: ACUTE PAIN

## 2022-09-19 ASSESSMENT — PAIN SCALES - GENERAL: PAINLEVEL_OUTOF10: 8

## 2022-09-19 ASSESSMENT — PAIN DESCRIPTION - DESCRIPTORS: DESCRIPTORS: SORE;DISCOMFORT

## 2022-09-19 ASSESSMENT — PAIN DESCRIPTION - LOCATION: LOCATION: THROAT

## 2022-09-19 ASSESSMENT — PAIN DESCRIPTION - FREQUENCY: FREQUENCY: CONTINUOUS

## 2022-09-19 NOTE — ED NOTES
I have reviewed discharge instructions with the patient. The patient verbalized understanding. Patient left ED via Discharge Method: ambulatory to Home with us calling transport for her . Opportunity for questions and clarification provided. Patient given 0 scripts. To continue your aftercare when you leave the hospital, you may receive an automated call from our care team to check in on how you are doing. This is a free service and part of our promise to provide the best care and service to meet your aftercare needs.  If you have questions, or wish to unsubscribe from this service please call [phone]. Thank you for Choosing our [facility] Emergency Department.           Jodie Marrero RN  09/19/22 [id number]

## 2022-09-19 NOTE — ED PROVIDER NOTES
Emergency Department Provider Note                   PCP:                Karin Lockett DO               Age: 68 y.o. Sex: female       ICD-10-CM    1. Fatigue, unspecified type  R53.83       2. Lightheadedness  R42           DISPOSITION Decision To Discharge 09/19/2022 05:21:31 PM       MDM  Number of Diagnoses or Management Options  Fatigue, unspecified type  Lightheadedness  Diagnosis management comments: Is a 41-year-old female presenting with generalized fatigue ongoing for the last 3 years. Today she had an episode where she felt lightheaded like she may pass out which seemed to concern her. She also has had intermittent issues with dizziness for the last 3 to 4 months that she has been seeing her primary doctor for. No headache, no chest pain, no shortness of breath. She is afebrile, nontoxic in appearance, vital signs within appropriate limits. Patient is alert and oriented x4, she does have thin frail appearance to her overall. She states she has chronic stomach issues and has a hard time eating more than a few bites of food at a time. Will check labs including CBC, CMP and urine, will get orthostatic vitals, EKG, chest x-ray. Orthostatics negative. Chest x-ray unremarkable. Labs Reviewed  CBC WITH AUTO DIFFERENTIAL - Abnormal; Notable for the following components:     MPV                           9.0 (*)             All other components within normal limits  COMPREHENSIVE METABOLIC PANEL - Abnormal; Notable for the following components:     Potassium                     3.4 (*)                Creatinine                    0.56 (*)            All other components within normal limits  COVID-19, RAPID    She does report feeling improved with IV fluid bolus provided in the ED. Discussed with patient that I do not see any emergent cause for her symptoms at this time. Will DC home advised that she follow-up closely with her primary provider further evaluation of her symptoms.   Discussed doctor and her cardiologist for the dizziness episodes but no one knows what is wrong with her. The history is provided by the patient. All other systems reviewed and are negative unless otherwise stated in the history of present illness section. Review of Systems   Constitutional:  Positive for fatigue. Negative for activity change, appetite change, chills and fever. HENT:  Negative for congestion, ear pain, rhinorrhea and sore throat. Eyes:  Negative for redness. Respiratory:  Negative for cough, chest tightness and shortness of breath. Cardiovascular:  Negative for chest pain. Gastrointestinal:  Negative for abdominal distention, abdominal pain, diarrhea, nausea and vomiting. Musculoskeletal:  Negative for back pain and neck pain. Skin:  Negative for rash. Neurological:  Positive for dizziness, weakness (generalized) and light-headedness. Negative for headaches. Psychiatric/Behavioral:  Negative for confusion.       Past Medical History:   Diagnosis Date    Atrophic vaginitis     Colon polyp     History of abnormal cervical Pap smear     S/P cervical conization 2016    Hyponatremia     IBS (irritable bowel syndrome)     Lichen sclerosus 6/84/5011    Osteoporosis         Past Surgical History:   Procedure Laterality Date    BREAST BIOPSY      BREAST SURGERY  1968    lumpectomy    COLONOSCOPY  09/2020    COLONOSCOPY      polyps    GI      GYN      colposcopy    GYN      [facility]    GYN      LEEP    OTHER SURGICAL HISTORY      colonscopy        Family History   Problem Relation Age of Onset    Colon Cancer Neg Hx     Breast Cancer Neg Hx     Lung Disease Father     Cancer Father         Lung    Alzheimer's Disease Mother     Obesity Mother     Cancer Brother         Brain Tumor    Hypertension Mother         Social History     Socioeconomic History    Marital status:      Spouse name: None    Number of children: None    Years of education: None    Highest education level: None provider. Vitals signs and nursing note reviewed. No data found. Physical Exam  Vitals and nursing note reviewed. Constitutional:       General: She is not in acute distress. Appearance: She is not toxic-appearing. Comments: Thin, frail appearance   HENT:      Head: Normocephalic and atraumatic. Cardiovascular:      Rate and Rhythm: Normal rate. Pulses: Normal pulses. Pulmonary:      Effort: Pulmonary effort is normal.      Breath sounds: Normal breath sounds. Neurological:      Mental Status: She is alert and oriented to person, place, and time. Psychiatric:         Mood and Affect: Mood normal.         Behavior: Behavior normal.         Thought Content: Thought content normal.         Judgment: Judgment normal.        Procedures    ED EKG Interpretation  EKG was interpreted in the absence of a cardiologist.    Rate: 76 bpm  EKG Interpretation: EKG Interpretation: sinus rhythm  ST Segments: Normal ST segments - NO STEMI    Results for orders placed or performed during the hospital encounter of 09/19/22   COVID-19, Rapid    Specimen: Nasopharyngeal   Result Value Ref Range    Source Nasopharyngeal      SARS-CoV-2, Rapid Not detected NOTD     XR CHEST PORTABLE    Narrative    EXAMINATION: CHEST RADIOGRAPH 9/19/2022 12:12 PM    ACCESSION NUMBER: [id number]    INDICATION: Syncope    COMPARISON: Chest x-ray 5/7/2020    TECHNIQUE: A single view of the chest was obtained. FINDINGS:     Support Lines and Tubes: None    Cardiac Silhouette: Within normal limits in size. Lungs: Unchanged biapical pleuroparenchymal scarring. No superimposed focal  airspace disease. Pleura: No pleural effusion. No pneumothorax. Osseous Structures: Unremarkable. Upper Abdomen: Unremarkable. Impression    No evidence of acute cardiopulmonary disease.      CBC with Auto Differential   Result Value Ref Range    WBC 4.7 4.3 - 11.1 K/uL    RBC 4.33 4.05 - 5.2 M/uL    Hemoglobin 13.2 11.7 - 15.4 g/dL    Hematocrit 41.1 35.8 - 46.3 %    MCV 94.9 79.6 - 97.8 FL    MCH 30.5 26.1 - 32.9 PG    MCHC 32.1 31.4 - 35.0 g/dL    RDW 13.2 11.9 - 14.6 %    Platelets 065 118 - 254 K/uL    MPV 9.0 (L) 9.4 - 12.3 FL    nRBC 0.00 0.0 - 0.2 K/uL    Differential Type AUTOMATED      Seg Neutrophils 61 43 - 78 %    Lymphocytes 27 13 - 44 %    Monocytes 10 4.0 - 12.0 %    Eosinophils % 1 0.5 - 7.8 %    Basophils 1 0.0 - 2.0 %    Immature Granulocytes 0 0.0 - 5.0 %    Segs Absolute 2.8 1.7 - 8.2 K/UL    Absolute Lymph # 1.2 0.5 - 4.6 K/UL    Absolute Mono # 0.5 0.1 - 1.3 K/UL    Absolute Eos # 0.1 0.0 - 0.8 K/UL    Basophils Absolute 0.0 0.0 - 0.2 K/UL    Absolute Immature Granulocyte 0.0 0.0 - 0.5 K/UL   Comprehensive Metabolic Panel   Result Value Ref Range    Sodium 144 136 - 145 mmol/L    Potassium 3.4 (L) 3.5 - 5.1 mmol/L    Chloride 109 101 - 110 mmol/L    CO2 31 21 - 32 mmol/L    Anion Gap 4 4 - 13 mmol/L    Glucose 89 65 - 100 mg/dL    BUN 8 8 - 23 MG/DL    Creatinine 0.56 (L) 0.6 - 1.0 MG/DL    GFR African American >60 >60 ml/min/1.73m2    GFR Non- >60 >60 ml/min/1.73m2    Calcium 9.5 8.3 - 10.4 MG/DL    Total Bilirubin 0.4 0.2 - 1.1 MG/DL    ALT 29 12 - 65 U/L    AST 27 15 - 37 U/L    Alk Phosphatase 72 50 - 130 U/L    Total Protein 7.0 6.3 - 8.2 g/dL    Albumin 3.8 3.2 - 4.6 g/dL    Globulin 3.2 2.3 - 3.5 g/dL    Albumin/Globulin Ratio 1.2 1.2 - 3.5     EKG 12 Lead   Result Value Ref Range    Ventricular Rate 76 BPM    Atrial Rate 76 BPM    P-R Interval 142 ms    QRS Duration 110 ms    Q-T Interval 390 ms    QTc Calculation (Bazett) 438 ms    P Axis 93 degrees    R Axis 81 degrees    T Axis 85 degrees    Diagnosis Normal sinus rhythm         XR CHEST PORTABLE   Final Result      No evidence of acute cardiopulmonary disease. Voice dictation software was used during the making of this note.   This software is not perfect and grammatical and other typographical errors may be present. This note has not been completely proofread for errors.      Megan Courtney  09/20/22 [id number]

## 2022-10-09 NOTE — PROGRESS NOTES
Adam Yip (:  1945) is a 68 y.o. female,Established patient, here for evaluation of the following chief complaint(s):  Follow-up (3 mth fu), Fatigue, and Bloated         ASSESSMENT/PLAN:  1. Chronic abdominal pain  2. Chronic fatigue  3. Anxiety  Normal labs from 2021 include DANIS, anti-smooth muscle antibody, anti-tTG, SPEP, CK level, hepatitis panel, cortisol level, thyroid function tests  Suspect patient symptoms likely due to functional motility disorder/IBS compounded by possible anxiety and depression  Colonoscopy on 2020 with hyperplastic polyp and diverticulosis  Most recent EGD with gastritis and gastric polyps  CT scan from  unremarkable  Right upper quadrant ultrasound from 6/10/2022 without gallstones or biliary obstruction  Labs from 2022 with normal vitamin D, cortisol, negative acetylcholine binding antibody and blocking antibody, normal TSH  Echo on 2022 with a EF of 60 to 94%, normal diastolic function, mild MR, trivial localized pericardial effusion around the right ventricle  Negative tilt table testing on 2022  EKG from 2022 shows NSR, normal axis, no acute ST segment or T wave abnormalities  Previously discussed case with patient's gastroenterologist who agreed that symptoms more likely functional in nature.   Given chronic nature and failure to improve have since placed referral to [address] for further evaluation  Have counseled patient in the past that functional motility disorders do not have curative treatment and likely have periodic exacerbations or fluctuations that would require supportive care  Currently on FD guard, probiotic  Previously prescribed Levsin however patient not certain if she tried  Discussed risk versus benefit of trial for which she agrees to, prescription sent to pharmacy  Regarding suspected mood component, PHQ-9 score of 7, AYO-7 score of 9 in the office today  I believe patient would be a good candidate for an antidepressant/anxiolytic however will need to investigate if formulation available that patient can either chew or can take via liquid given fear of taking tablets or capsules given prior dysphagia    - Hyoscyamine Sulfate SL (LEVSIN/SL) 0.125 MG SUBL; Place one tablet under the tongue every 6 hours as needed for abdominal discomfort  Dispense: 10 each; Refill: 0        Return in about 3 months (around 1/10/2023) for EOV . Subjective   SUBJECTIVE/OBJECTIVE:  Patient is a 57-year-old  female who presents to the office today company by her close friend for follow-up. Patient for the past 3 years continues to have chronic fatigue and intermittent degrees of abdominal discomfort including bloating typically located in the lower abdomen. Has undergone extensive testing including labs, imaging, EGD and colonoscopy without definitive etiology being discovered. It has been thought that patient has some form of IBS or functional motility syndrome. Since last visit referral to [address] [place] [address] for tertiary care evaluation has been placed. As patient and her friend have discussed they both think that her symptoms may be stress/anxiety related. However treatment options are limited as patient has adversity taking pills and capsules given history of dysphagia compounded by the fact that she lives alone. Was seen in the emergency room last month for fatigue and lightheadedness. Still endorses chronic fatigue, some nausea. Does have subjective fevers for the past month not daily with highest temperature being 100.2 °F.  Continues to endorse lower abdominal bloating sometimes improved with having a bowel movement. On average moves her bowels once a day. No melena, hematochezia, vomiting, rigors, dysuria, hematuria, vaginal bleeding or discharge or reflux. States sometimes besides the bloating sensation she will have occasional burning in her abdomen. Currently taking FD guard, align probiotic, stomach is tea. Breakfast consist of some toast and a egg, lunch is variable and dinner usually consists of soup. Review of Systems   Constitutional:  Positive for fatigue and fever (Occasional). Denies rigors    HENT:  Negative for postnasal drip, sinus pressure, sinus pain and trouble swallowing. Occasional hoarseness   Respiratory:          Denies sputum production   Gastrointestinal:  Positive for abdominal pain and nausea. Negative for anal bleeding, blood in stool and vomiting. Denies reflux   Genitourinary:  Negative for dysuria, hematuria, vaginal bleeding and vaginal discharge. Objective   Physical Exam  Vitals reviewed. Constitutional:       General: She is not in acute distress. Appearance: She is not toxic-appearing or diaphoretic. Comments: Elderly, thin  female in no acute cardiopulmonary distress   HENT:      Head: Normocephalic and atraumatic. Eyes:      General:         Right eye: No discharge. Left eye: No discharge. Extraocular Movements: Extraocular movements intact. Neck:      Trachea: No tracheal tenderness, tracheostomy or tracheal deviation. Cardiovascular:      Rate and Rhythm: Normal rate and regular rhythm. Heart sounds: No murmur heard. No friction rub. No gallop. Pulmonary:      Effort: Pulmonary effort is normal. No respiratory distress. Breath sounds: No wheezing, rhonchi or rales. Abdominal:      General: Bowel sounds are normal.      Palpations: Abdomen is soft. Tenderness: There is abdominal tenderness (Minimal) in the right lower quadrant. There is no guarding. Genitourinary:     Comments: No suprapubic tenderness to palpation  Musculoskeletal:         General: No swelling. Cervical back: Neck supple. No tenderness. Right lower leg: No edema. Left lower leg: No edema. Lymphadenopathy:      Cervical:      Right cervical: No superficial cervical adenopathy.      Left cervical: No superficial cervical adenopathy. Upper Body:      Right upper body: No supraclavicular adenopathy. Left upper body: No supraclavicular adenopathy. Lower Body: No right inguinal adenopathy. No left inguinal adenopathy. Skin:     General: Skin is warm and dry. Coloration: Skin is not jaundiced. Neurological:      Mental Status: She is alert. Mental status is at baseline. Psychiatric:         Attention and Perception: Attention normal.         Mood and Affect: Affect normal. Mood is depressed. Mood is not anxious. Affect is not tearful. Speech: Speech normal. Speech is not rapid and pressured or slurred. Behavior: Behavior normal. Behavior is not agitated, aggressive or combative. Behavior is cooperative. Thought Content: Thought content normal.                An electronic signature was used to authenticate this note.     --Carissa Benjamin, DO

## 2022-10-10 ENCOUNTER — OFFICE VISIT (OUTPATIENT)
Dept: INTERNAL MEDICINE CLINIC | Facility: CLINIC | Age: 77
End: 2022-10-10
Payer: MEDICARE

## 2022-10-10 VITALS
TEMPERATURE: 97.3 F | HEART RATE: 86 BPM | HEIGHT: 63 IN | SYSTOLIC BLOOD PRESSURE: 118 MMHG | DIASTOLIC BLOOD PRESSURE: 76 MMHG | WEIGHT: 117.2 LBS | OXYGEN SATURATION: 98 % | BODY MASS INDEX: 20.77 KG/M2

## 2022-10-10 DIAGNOSIS — R10.9 CHRONIC ABDOMINAL PAIN: Primary | ICD-10-CM

## 2022-10-10 DIAGNOSIS — G89.29 CHRONIC ABDOMINAL PAIN: Primary | ICD-10-CM

## 2022-10-10 DIAGNOSIS — F41.9 ANXIETY: ICD-10-CM

## 2022-10-10 DIAGNOSIS — R53.82 CHRONIC FATIGUE: ICD-10-CM

## 2022-10-10 PROCEDURE — 1123F ACP DISCUSS/DSCN MKR DOCD: CPT | Performed by: STUDENT IN AN ORGANIZED HEALTH CARE EDUCATION/TRAINING PROGRAM

## 2022-10-10 PROCEDURE — 99214 OFFICE O/P EST MOD 30 MIN: CPT | Performed by: STUDENT IN AN ORGANIZED HEALTH CARE EDUCATION/TRAINING PROGRAM

## 2022-10-10 PROCEDURE — 1036F TOBACCO NON-USER: CPT | Performed by: STUDENT IN AN ORGANIZED HEALTH CARE EDUCATION/TRAINING PROGRAM

## 2022-10-10 PROCEDURE — G8420 CALC BMI NORM PARAMETERS: HCPCS | Performed by: STUDENT IN AN ORGANIZED HEALTH CARE EDUCATION/TRAINING PROGRAM

## 2022-10-10 PROCEDURE — G8484 FLU IMMUNIZE NO ADMIN: HCPCS | Performed by: STUDENT IN AN ORGANIZED HEALTH CARE EDUCATION/TRAINING PROGRAM

## 2022-10-10 PROCEDURE — G8400 PT W/DXA NO RESULTS DOC: HCPCS | Performed by: STUDENT IN AN ORGANIZED HEALTH CARE EDUCATION/TRAINING PROGRAM

## 2022-10-10 PROCEDURE — G8427 DOCREV CUR MEDS BY ELIG CLIN: HCPCS | Performed by: STUDENT IN AN ORGANIZED HEALTH CARE EDUCATION/TRAINING PROGRAM

## 2022-10-10 PROCEDURE — 1090F PRES/ABSN URINE INCON ASSESS: CPT | Performed by: STUDENT IN AN ORGANIZED HEALTH CARE EDUCATION/TRAINING PROGRAM

## 2022-10-10 RX ORDER — HYOSCYAMINE SULFATE 0.12 MG/1
TABLET SUBLINGUAL
Qty: 10 EACH | Refills: 0 | Status: SHIPPED | OUTPATIENT
Start: 2022-10-10

## 2022-10-10 ASSESSMENT — PATIENT HEALTH QUESTIONNAIRE - PHQ9
SUM OF ALL RESPONSES TO PHQ QUESTIONS 1-9: 7
2. FEELING DOWN, DEPRESSED OR HOPELESS: 1
6. FEELING BAD ABOUT YOURSELF - OR THAT YOU ARE A FAILURE OR HAVE LET YOURSELF OR YOUR FAMILY DOWN: 1
1. LITTLE INTEREST OR PLEASURE IN DOING THINGS: 1
7. TROUBLE CONCENTRATING ON THINGS, SUCH AS READING THE NEWSPAPER OR WATCHING TELEVISION: 0
SUM OF ALL RESPONSES TO PHQ9 QUESTIONS 1 & 2: 2
4. FEELING TIRED OR HAVING LITTLE ENERGY: 2
SUM OF ALL RESPONSES TO PHQ QUESTIONS 1-9: 7
SUM OF ALL RESPONSES TO PHQ QUESTIONS 1-9: 7
8. MOVING OR SPEAKING SO SLOWLY THAT OTHER PEOPLE COULD HAVE NOTICED. OR THE OPPOSITE, BEING SO FIGETY OR RESTLESS THAT YOU HAVE BEEN MOVING AROUND A LOT MORE THAN USUAL: 1
9. THOUGHTS THAT YOU WOULD BE BETTER OFF DEAD, OR OF HURTING YOURSELF: 0
10. IF YOU CHECKED OFF ANY PROBLEMS, HOW DIFFICULT HAVE THESE PROBLEMS MADE IT FOR YOU TO DO YOUR WORK, TAKE CARE OF THINGS AT HOME, OR GET ALONG WITH OTHER PEOPLE: 1
3. TROUBLE FALLING OR STAYING ASLEEP: 0
SUM OF ALL RESPONSES TO PHQ QUESTIONS 1-9: 7
5. POOR APPETITE OR OVEREATING: 1

## 2022-10-10 ASSESSMENT — ANXIETY QUESTIONNAIRES
7. FEELING AFRAID AS IF SOMETHING AWFUL MIGHT HAPPEN: 1
GAD7 TOTAL SCORE: 9
3. WORRYING TOO MUCH ABOUT DIFFERENT THINGS: 2
6. BECOMING EASILY ANNOYED OR IRRITABLE: 1
1. FEELING NERVOUS, ANXIOUS, OR ON EDGE: 1
5. BEING SO RESTLESS THAT IT IS HARD TO SIT STILL: 1
2. NOT BEING ABLE TO STOP OR CONTROL WORRYING: 2
IF YOU CHECKED OFF ANY PROBLEMS ON THIS QUESTIONNAIRE, HOW DIFFICULT HAVE THESE PROBLEMS MADE IT FOR YOU TO DO YOUR WORK, TAKE CARE OF THINGS AT HOME, OR GET ALONG WITH OTHER PEOPLE: SOMEWHAT DIFFICULT
4. TROUBLE RELAXING: 1

## 2022-10-10 ASSESSMENT — ENCOUNTER SYMPTOMS
TROUBLE SWALLOWING: 0
SINUS PAIN: 0
VOMITING: 0
NAUSEA: 1
ABDOMINAL PAIN: 1
BLOOD IN STOOL: 0
ANAL BLEEDING: 0
SINUS PRESSURE: 0

## 2022-10-13 ENCOUNTER — TELEPHONE (OUTPATIENT)
Dept: INTERNAL MEDICINE CLINIC | Facility: CLINIC | Age: 77
End: 2022-10-13

## 2022-10-13 NOTE — TELEPHONE ENCOUNTER
Personally called and spoke with patient's pharmacy and spoke with pharmacist who stated that with exception of venlafaxine a majority of the SSRIs could be crushed and taken. Called and spoke with patient who confirmed identity via date of birth to see if she was still interested in starting anxiolytic. However she stated that she has been feeling better the past couple days after starting an herbal tea specifically designed to help with stress. Would desire to hold off additional medications at this time.

## 2022-11-05 ENCOUNTER — HOSPITAL ENCOUNTER (OUTPATIENT)
Dept: MAMMOGRAPHY | Age: 77
Discharge: HOME | End: 2022-11-08
Payer: MEDICARE

## 2022-11-05 DIAGNOSIS — Z12.31 VISIT FOR SCREENING MAMMOGRAM: ICD-10-CM

## 2022-11-05 PROCEDURE — 77063 BREAST TOMOSYNTHESIS BI: CPT

## 2022-11-21 ENCOUNTER — HOSPITAL ENCOUNTER (OUTPATIENT)
Dept: CT IMAGING | Age: 77
Discharge: HOME | End: 2022-11-24
Payer: MEDICARE

## 2022-11-21 DIAGNOSIS — K58.1 IRRITABLE BOWEL SYNDROME WITH CONSTIPATION: ICD-10-CM

## 2022-11-21 DIAGNOSIS — K57.90 DIVERTICULOSIS: ICD-10-CM

## 2022-11-21 DIAGNOSIS — R10.30 LOWER ABDOMINAL PAIN: ICD-10-CM

## 2022-11-21 LAB — CREAT BLD-MCNC: 0.53 MG/DL (ref 0.8–1.5)

## 2022-11-21 PROCEDURE — 74177 CT ABD & PELVIS W/CONTRAST: CPT

## 2022-11-21 PROCEDURE — 6360000004 HC RX CONTRAST MEDICATION: Performed by: NURSE PRACTITIONER

## 2022-11-21 PROCEDURE — 82565 ASSAY OF CREATININE: CPT

## 2022-11-21 RX ADMIN — DIATRIZOATE MEGLUMINE AND DIATRIZOATE SODIUM 15 ML: 660; 100 LIQUID ORAL; RECTAL at 14:28

## 2022-11-21 RX ADMIN — IOPAMIDOL 100 ML: 755 INJECTION, SOLUTION INTRAVENOUS at 14:28

## 2022-12-06 ENCOUNTER — TELEPHONE (OUTPATIENT)
Dept: INTERNAL MEDICINE CLINIC | Facility: CLINIC | Age: 77
End: 2022-12-06

## 2022-12-06 NOTE — TELEPHONE ENCOUNTER
Pt sts she called Fri and never got a call back but feels awful,   Pt c/o no energy, sleeps all the time and has a temp that goes up and down at least 2 times a day. Pt sts this is like nothing she has ever had before. Please advise.

## 2022-12-06 NOTE — TELEPHONE ENCOUNTER
Pt advised  Per Dr Matti Allan   Several of these symptoms sound very similar to what we have been trying to address and treat for quite a while. Unfortunately despite an exhaustive number of tests and imaging I do not have an explanation for her symptoms. I do believe that some of her symptoms are mood based, perhaps anxiety and depression. However given her fear of taking medications our options for potentially treating her mood and other symptoms are limited. If she is feeling like her symptoms have changed or worsened I would recommend doing an at home covid test to rule this out. Otherwise she can arrange an appointment to see myself or another available provider. However I do not know what else to offer her at this time.

## 2022-12-09 ENCOUNTER — TELEPHONE (OUTPATIENT)
Dept: INTERNAL MEDICINE CLINIC | Facility: CLINIC | Age: 77
End: 2022-12-09

## 2022-12-09 NOTE — TELEPHONE ENCOUNTER
Pt called back and sts she is so weak and has no energy at all and is to the point that she will try anything if it will help her feel better.

## 2022-12-22 NOTE — TELEPHONE ENCOUNTER
Pt advised  Per Dr Mirella Malone   If patient is interested in possibly starting antidepressive medication then she would require an office or virtual visit so we can discuss the risk versus benefit and find an appropriate option for her. Pt will call back for appt.

## 2022-12-29 ENCOUNTER — TELEPHONE (OUTPATIENT)
Dept: INTERNAL MEDICINE CLINIC | Facility: CLINIC | Age: 77
End: 2022-12-29

## 2022-12-29 NOTE — TELEPHONE ENCOUNTER
Pt called w/concerns of covid. Offered the pt soonest vv appt next week, as well as the options of urgent care or ER to be seen sooner. Pt states she would like to be tested, but was told that nowhere could test her in the next week when she called other locations. Vv appt has been made for pt on 1/6.

## 2023-01-06 ENCOUNTER — TELEMEDICINE (OUTPATIENT)
Dept: INTERNAL MEDICINE CLINIC | Facility: CLINIC | Age: 78
End: 2023-01-06
Payer: MEDICARE

## 2023-01-06 DIAGNOSIS — G89.29 CHRONIC ABDOMINAL PAIN: Primary | ICD-10-CM

## 2023-01-06 DIAGNOSIS — R64 EMACIATED (HCC): ICD-10-CM

## 2023-01-06 DIAGNOSIS — R10.9 CHRONIC ABDOMINAL PAIN: Primary | ICD-10-CM

## 2023-01-06 DIAGNOSIS — R63.39 AVERSION TO FOOD: ICD-10-CM

## 2023-01-06 DIAGNOSIS — E44.0 MODERATE PROTEIN-CALORIE MALNUTRITION (HCC): ICD-10-CM

## 2023-01-06 PROCEDURE — 99442 PR PHYS/QHP TELEPHONE EVALUATION 11-20 MIN: CPT | Performed by: NURSE PRACTITIONER

## 2023-01-06 ASSESSMENT — ENCOUNTER SYMPTOMS
COLOR CHANGE: 0
SHORTNESS OF BREATH: 0
ABDOMINAL DISTENTION: 0
EYE ITCHING: 0
ABDOMINAL PAIN: 0
EYE PAIN: 0
SINUS PAIN: 0
EYE REDNESS: 0
BACK PAIN: 0
NAUSEA: 0
DIARRHEA: 0
APNEA: 0
EYE DISCHARGE: 0
CONSTIPATION: 0
COUGH: 0

## 2023-01-06 NOTE — PROGRESS NOTES
[address]      PROGRESS NOTE    SUBJECTIVE:   Katiana Lynne is a 68 y.o. female seen for a follow up visit regarding the following chief complaint:     No chief complaint on file. HPI  Patient presents with concerns of ongoing weakness and fatigue for over the past 1-2 years. She is having little energy and has a history of inadequate nutrition due to inability to tolerate foods secondary to gastritis and pain after eating. She has followed with gastroenterology. She takes daily probiotics and yogurt to help with symptoms. Colonoscopy on 9/17/2020 with hyperplastic polyp and diverticulosis  Most recent EGD with gastritis and gastric polyps  CT scan from 2021 unremarkable  Right upper quadrant ultrasound from 6/10/2022 without gallstones or biliary obstruction  Labs from 8/18/2022 with normal vitamin D, cortisol, negative acetylcholine binding antibody and blocking antibody, normal TSH.   Following nutritionist.  Current Outpatient Medications   Medication Sig Dispense Refill    Hyoscyamine Sulfate SL (LEVSIN/SL) 0.125 MG SUBL Place one tablet under the tongue every 6 hours as needed for abdominal discomfort 10 each 0    UNABLE TO FIND CBD Gummies 300mg (Patient not taking: Reported on 9/15/2022)      meclizine (ANTIVERT) 25 MG CHEW Take 25 mg by mouth 3 times daily as needed (Patient not taking: Reported on 9/15/2022)      Homeopathic Products (ARNICARE ARTHRITIS PO) Take by mouth      Apoaequorin (PREVAGEN PO) Take by mouth      Turmeric 450 MG CAPS Take by mouth      St Johns Wort 300 MG CAPS Take by mouth      Flax OIL Take by mouth 1 Tablespoon before bedtime      UNABLE TO FIND Stomach Ease Tea      UNABLE TO FIND Take 1 capsule by mouth daily Tinnitus 911 + Vit B12      Probiotic Product (ALIGN) 4 MG CAPS Take 1 capsule by mouth daily      Weldon Oil-Levomenthol (FDGARD PO) Take 3 capsules by mouth in the morning, at noon, and at bedtime Richard oil- 50 mg, l-Menthol- 41.5 mg  1 capsule before breakfast, lunch and dinner. Acetaminophen 325 MG CAPS Take 1 tablet by mouth as needed      ascorbic acid (VITAMIN C) 500 MG tablet Take 1,000 mg by mouth 2 times daily      vitamin D (CHOLECALCIFEROL) 25 MCG (1000 UT) TABS tablet Take 2,000 Units by mouth 2 times daily      cyanocobalamin 1000 MCG tablet Take 1,000 mcg by mouth every 7 days       No current facility-administered medications for this visit. Allergies   Allergen Reactions    Erythromycin Shortness Of Breath    Alendronate Other (See Comments)     SIDE EFFECTS ONLY    Fluocinonide Swelling    Paroxetine Hcl Swelling     Tongue swelling       Past Medical History:   Diagnosis Date    Atrophic vaginitis     Colon polyp     History of abnormal cervical Pap smear     S/P cervical conization     Hyponatremia     IBS (irritable bowel syndrome)     Lichen sclerosus     Osteoporosis      Past Surgical History:   Procedure Laterality Date    BREAST BIOPSY      BREAST SURGERY  1968    lumpectomy    COLONOSCOPY  2020    COLONOSCOPY      polyps    GI      GYN      colposcopy    GYN      [facility]    GYN      LEEP    OTHER SURGICAL HISTORY      colonscopy     Family History   Problem Relation Age of Onset    Colon Cancer Neg Hx     Breast Cancer Neg Hx     Lung Disease Father     Cancer Father         Lung    Alzheimer's Disease Mother     Obesity Mother     Cancer Brother         Brain Tumor    Hypertension Mother      Social History     Tobacco Use    Smoking status: Former     Packs/day: 1.00     Types: Cigarettes     Quit date: 10/17/1993     Years since quittin.2    Smokeless tobacco: Never   Substance Use Topics    Alcohol use: Not Currently       Review of Systems   Constitutional:  Negative for activity change, appetite change, chills, fatigue and fever. HENT:  Negative for congestion, ear pain and sinus pain. Eyes:  Negative for pain, discharge, redness and itching. Respiratory:  Negative for apnea, cough and shortness of breath. Cardiovascular:  Negative for chest pain, palpitations and leg swelling. Gastrointestinal:  Negative for abdominal distention, abdominal pain, constipation, diarrhea and nausea. Endocrine: Negative for cold intolerance and heat intolerance. Genitourinary:  Negative for difficulty urinating, dysuria, enuresis and urgency. Generalized abdominal pain with eating; food aversion. Musculoskeletal:  Negative for arthralgias, back pain, joint swelling, myalgias and neck pain. Skin:  Negative for color change and rash. Neurological:  Negative for dizziness, weakness and headaches. Psychiatric/Behavioral:  Negative for behavioral problems and sleep disturbance. The patient is nervous/anxious. OBJECTIVE:  There were no vitals taken for this visit. Physical Exam  NA    ASSESSMENT and PLAN  Diagnoses and all orders for this visit:    Chronic abdominal pain  -     External Referral To Gastroenterology    Emaciated [facility])  -     External Referral To Gastroenterology    Aversion to food  -     External Referral To Gastroenterology    Moderate protein-calorie malnutrition [facility])  -     External Referral To Gastroenterology    We discussed following with [facility] due to chronicity of illness and complexity without any improvement in spite of interventions and specialty referrals. Pursuant to the emergency declaration under the [facility], [facility] waiver authority and the [employer] [facility] General Act, this Virtual Visit was conducted, with patient's consent, to reduce the patient's risk of exposure to COVID-19 and provide continuity of care for an established patient. Services were provided through a telephone synchronous discussion virtually to substitute for in-person clinic visit. Patient consented to virtual visit.   Greater than 50% of this 15 min visit was spent counseling the patient about test results, prognosis, importance of compliance, education about disease process, benefits of medications, instructions for management of acute symptoms, and follow up plans. Follow-up and Dispositions    Return for after [facility]- [address] [place] [address] consult. Lesley Merritt NP, APRN - CNP

## 2023-01-08 NOTE — PROGRESS NOTES
Lobito Quesada (:  1945) is a 68 y.o. female,Established patient, here for evaluation of the following chief complaint(s):  Follow-up (Routine fu and still having weakness, fatigue, sleeps a lot and abd pain) and Abdominal Pain         ASSESSMENT/PLAN:  1. Chronic abdominal pain  Normal labs from 2021 include DANIS, anti-smooth muscle antibody, anti-tTG, SPEP, CK level, hepatitis panel, cortisol level, thyroid function tests  Suspect patient symptoms likely due to functional motility disorder/IBS compounded by possible anxiety and depression  Colonoscopy on 2020 with hyperplastic polyp and diverticulosis  Most recent EGD with gastritis and gastric polyps  CT scan from  as well as 2022 unremarkable  Right upper quadrant ultrasound from 6/10/2022 without gallstones or biliary obstruction  Labs from 2022 with normal vitamin D, cortisol, negative acetylcholine binding antibody and blocking antibody, normal TSH  Echo on 2022 with a EF of 60 to 24%, normal diastolic function, mild MR, trivial localized pericardial effusion around the right ventricle  Negative tilt table testing on 2022  EKG from 2022 shows NSR, normal axis, no acute ST segment or T wave abnormalities  Previously discussed case with patient's gastroenterologist who agreed that symptoms more likely functional in nature. Given chronic nature and failure to improve have since placed referral to [address] for further evaluation.  Contact information provided today   Have counseled patient in the past that functional motility disorders do not have curative treatment and likely have periodic exacerbations or fluctuations that would require supportive care  Limited options treatment wise given patient has fear of taking pills that she cannot chew given isolated episode of dysphagia in the past compounded by the fact that she lives alone  Previously prescribed sublingual Levsin but she was uncertain if she had ever tried it. New prescription sent today   Given likely concurrent mood component, PHQ-9 score of 7 with AYO-7 score of 9 in October of last year  Have discussed on multiple occasions that I feel patient would be an appropriate candidate for an antidepressive/anxiolytic medication. Has previously declined referral for cognitive behavioral therapy    - Hyoscyamine Sulfate SL (LEVSIN/SL) 0.125 MG SUBL; Place 1 tablet under the tongue every 6 hours as needed (abdominal pain)  Dispense: 120 each; Refill: 1      No follow-ups on file. Subjective   SUBJECTIVE/OBJECTIVE:  Patient is a 31-year-old  female who presents to the office today for continued abdominal pain. Symptoms have been ongoing for the past several years. Feels like she has worsening bilateral lower quadrant abdominal pain with associated bloating. Does have occasional nausea but no vomiting. Does suffer from constipation and takes a serving of flaxseed oil at nighttime and moves her bowels once a day but still has straining with defecation. Stays hydrated. Appetite is less chronically secondary to her pain. Patient believes this is also associated with her chronic fatigue and low energy where she is sleeping more often. Patient is currently taking various over-the-counter supplements including IBgard, tinnitus supplement, coldeeze (3 days a week sometimes a few times a day). Has had an extensive work-up including labs, imaging and endoscopic evaluation without definitive diagnosis. Has since been referred to [facility] for tertiary care evaluation but has not yet heard back. Patient hesitant to take prescription medications due to side effects and also concerned that she cannot swallow pills and has to chew her medications or open up capsules given remote history of dysphagia with patient concerned that she may choke since she lives alone.   Denies current reflux, difficulty swallowing, vomiting, melena, hematochezia, dysuria, hematuria, vaginal bleeding or discharge. Has chronic tinnitus but no hearing loss. States that she tried taking sambucol over-the-counter but had intense worsening of her abdominal pain with associated weakness, faintness and dizziness since improved since stopping the supplement. Review of Systems   Constitutional:  Positive for appetite change (Decreased) and fatigue. HENT:  Positive for tinnitus (chronic). Negative for hearing loss and trouble swallowing. Gastrointestinal:  Positive for abdominal pain, constipation and nausea. Negative for anal bleeding, blood in stool and vomiting. Denies reflux   Genitourinary:  Negative for dysuria, hematuria, vaginal bleeding and vaginal discharge. Objective   Physical Exam  Vitals reviewed. Constitutional:       General: She is not in acute distress. Appearance: Normal appearance. She is not ill-appearing, toxic-appearing or diaphoretic. HENT:      Head: Normocephalic and atraumatic. Eyes:      General:         Right eye: No discharge. Left eye: No discharge. Extraocular Movements: Extraocular movements intact. Cardiovascular:      Rate and Rhythm: Normal rate and regular rhythm. Heart sounds: No murmur heard. No friction rub. No gallop. Pulmonary:      Effort: Pulmonary effort is normal. No respiratory distress. Breath sounds: No wheezing, rhonchi or rales. Abdominal:      General: Abdomen is flat. Bowel sounds are normal.      Palpations: Abdomen is soft. Tenderness: There is no abdominal tenderness. There is no guarding. Skin:     Coloration: Skin is not jaundiced. Neurological:      Mental Status: She is alert. Mental status is at baseline. Psychiatric:         Attention and Perception: Attention normal.         Mood and Affect: Mood is depressed. Affect is blunt. Affect is not tearful. Speech: Speech normal. Speech is not rapid and pressured or slurred.          Behavior: Behavior normal. Behavior is not agitated, aggressive or combative. Behavior is cooperative. Thought Content: Thought content normal.                An electronic signature was used to authenticate this note.     --Jeremías Kwon, DO

## 2023-01-11 ENCOUNTER — TELEPHONE (OUTPATIENT)
Dept: INTERNAL MEDICINE CLINIC | Facility: CLINIC | Age: 78
End: 2023-01-11

## 2023-01-11 ENCOUNTER — OFFICE VISIT (OUTPATIENT)
Dept: INTERNAL MEDICINE CLINIC | Facility: CLINIC | Age: 78
End: 2023-01-11
Payer: MEDICARE

## 2023-01-11 VITALS
WEIGHT: 118 LBS | DIASTOLIC BLOOD PRESSURE: 68 MMHG | BODY MASS INDEX: 20.91 KG/M2 | SYSTOLIC BLOOD PRESSURE: 122 MMHG | HEART RATE: 70 BPM | TEMPERATURE: 97.3 F | HEIGHT: 63 IN | OXYGEN SATURATION: 98 %

## 2023-01-11 DIAGNOSIS — G89.29 CHRONIC ABDOMINAL PAIN: ICD-10-CM

## 2023-01-11 DIAGNOSIS — R10.9 CHRONIC ABDOMINAL PAIN: Primary | ICD-10-CM

## 2023-01-11 DIAGNOSIS — G89.29 CHRONIC ABDOMINAL PAIN: Primary | ICD-10-CM

## 2023-01-11 DIAGNOSIS — R10.9 CHRONIC ABDOMINAL PAIN: ICD-10-CM

## 2023-01-11 PROCEDURE — G8427 DOCREV CUR MEDS BY ELIG CLIN: HCPCS | Performed by: STUDENT IN AN ORGANIZED HEALTH CARE EDUCATION/TRAINING PROGRAM

## 2023-01-11 PROCEDURE — G8484 FLU IMMUNIZE NO ADMIN: HCPCS | Performed by: STUDENT IN AN ORGANIZED HEALTH CARE EDUCATION/TRAINING PROGRAM

## 2023-01-11 PROCEDURE — 1090F PRES/ABSN URINE INCON ASSESS: CPT | Performed by: STUDENT IN AN ORGANIZED HEALTH CARE EDUCATION/TRAINING PROGRAM

## 2023-01-11 PROCEDURE — 1123F ACP DISCUSS/DSCN MKR DOCD: CPT | Performed by: STUDENT IN AN ORGANIZED HEALTH CARE EDUCATION/TRAINING PROGRAM

## 2023-01-11 PROCEDURE — G8420 CALC BMI NORM PARAMETERS: HCPCS | Performed by: STUDENT IN AN ORGANIZED HEALTH CARE EDUCATION/TRAINING PROGRAM

## 2023-01-11 PROCEDURE — 99214 OFFICE O/P EST MOD 30 MIN: CPT | Performed by: STUDENT IN AN ORGANIZED HEALTH CARE EDUCATION/TRAINING PROGRAM

## 2023-01-11 PROCEDURE — 1036F TOBACCO NON-USER: CPT | Performed by: STUDENT IN AN ORGANIZED HEALTH CARE EDUCATION/TRAINING PROGRAM

## 2023-01-11 PROCEDURE — G8400 PT W/DXA NO RESULTS DOC: HCPCS | Performed by: STUDENT IN AN ORGANIZED HEALTH CARE EDUCATION/TRAINING PROGRAM

## 2023-01-11 RX ORDER — HYOSCYAMINE SULFATE 0.12 MG/1
1 TABLET SUBLINGUAL EVERY 6 HOURS PRN
Qty: 120 EACH | Refills: 1 | Status: SHIPPED | OUTPATIENT
Start: 2023-01-11

## 2023-01-11 ASSESSMENT — ENCOUNTER SYMPTOMS
ABDOMINAL PAIN: 1
VOMITING: 0
BLOOD IN STOOL: 0
ANAL BLEEDING: 0
CONSTIPATION: 1
TROUBLE SWALLOWING: 0
NAUSEA: 1

## 2023-01-11 NOTE — TELEPHONE ENCOUNTER
Pt called, requests just 15 qty of stomach medication. as the price of medication is over 100 dollars. Pt does not know name of medication.

## 2023-01-17 RX ORDER — HYOSCYAMINE SULFATE 0.12 MG/1
1 TABLET SUBLINGUAL EVERY 6 HOURS PRN
Qty: 15 EACH | Refills: 1 | Status: SHIPPED | OUTPATIENT
Start: 2023-01-17

## 2023-01-17 NOTE — TELEPHONE ENCOUNTER
New prescription for updated quantity sent to pharmacy on record    Orders Placed This Encounter    Hyoscyamine Sulfate SL (LEVSIN/SL) 0.125 MG SUBL     Sig: Place 1 tablet under the tongue every 6 hours as needed (abdominal pain)     Dispense:  15 each     Refill:  1

## 2023-02-14 NOTE — PROGRESS NOTES
CORNEL Kelly is a 68 y.o. female seen for vulvar \"soreness\". She has been using Estrace vag cream externally 2-3 time per week. She has a hx of lichen sclerosis but she has not b een using clobetasol cream.      Past Medical History, Past Surgical History, Family history, Social History, and Medications were all reviewed with the patient today and updated as necessary. Current Outpatient Medications   Medication Sig    estradiol (ESTRACE VAGINAL) 0.1 MG/GM vaginal cream Place 1 g vaginally three times a week Externally once daily    clobetasol (TEMOVATE) 0.05 % cream Apply topically 2-3 times daily. meclizine (ANTIVERT) 25 MG CHEW Take 25 mg by mouth 3 times daily as needed    Homeopathic Products (ARNICARE ARTHRITIS PO) Take by mouth    Flax OIL Take by mouth 1 Tablespoon before bedtime    UNABLE TO FIND Stomach Ease Tea    UNABLE TO FIND Take 1 capsule by mouth daily Tinnitus 911 + Vit B12    Acetaminophen 325 MG CAPS Take 1 tablet by mouth as needed    ascorbic acid (VITAMIN C) 500 MG tablet Take 1,000 mg by mouth 2 times daily    vitamin D (CHOLECALCIFEROL) 25 MCG (1000 UT) TABS tablet Take 2,000 Units by mouth 2 times daily    cyanocobalamin 1000 MCG tablet Take 1,000 mcg by mouth Twice a Week     No current facility-administered medications for this visit.      Allergies   Allergen Reactions    Erythromycin Shortness Of Breath    Alendronate Other (See Comments)     SIDE EFFECTS ONLY    Fluocinonide Swelling    Paroxetine Hcl Swelling     Tongue swelling     Past Medical History:   Diagnosis Date    Atrophic vaginitis     Colon polyp     History of abnormal cervical Pap smear     S/P cervical conization 2016    Hyponatremia     IBS (irritable bowel syndrome)     Lichen sclerosus 7/76/4777    Osteoporosis      Past Surgical History:   Procedure Laterality Date    BREAST SURGERY  1968    lumpectomy    COLONOSCOPY  09/2020    COLONOSCOPY      polyps    GI      GYN      colposcopy GYN      [facility]    GYN      LEEP    OTHER SURGICAL HISTORY      colonscopy     Family History   Problem Relation Age of Onset    Colon Cancer Neg Hx     Breast Cancer Neg Hx     Lung Disease Father     Cancer Father         Lung    Alzheimer's Disease Mother     Obesity Mother     Cancer Brother         Brain Tumor    Hypertension Mother       Social History     Tobacco Use    Smoking status: Former     Packs/day: 1.00     Types: Cigarettes     Quit date: 10/17/1993     Years since quittin.3    Smokeless tobacco: Never   Substance Use Topics    Alcohol use: Not Currently       Social History     Substance and Sexual Activity   Sexual Activity Not on file    Comment:      OB History    Para Term  AB Living   3 2 0 0 1 0   SAB IAB Ectopic Molar Multiple Live Births   0 0 0 0 0 0      # Outcome Date GA Lbr Yong/2nd Weight Sex Delivery Anes PTL Lv   3 AB            2 Para            1 Para               Obstetric Comments   Vaginally       ROS:    Review of Systems  General: Not Present- Chills, Fever, Fatigue, Insomnia, Hot flashes/Night sweats, Weight gain  Skin: Not Present- Bruising, Change in Wart/Mole, Excessive Sweating, Itching, Nail Changes, New Lesions, Rash, Skin Color Changes and Ulcer. HEENT: Not Present- Headache, Blurred Vision, Double Vision, Glaucoma, Visual Disturbances, Hearing Loss, Ringing in the Ears, Vertigo, Nose Bleed, Bleeding Gums, Hoarseness and Sore Throat. Neck: Not Present- Neck Pain and Neck Swelling. Respiratory: Not Present- Cough, Difficulty Breathing and Difficulty Breathing on Exertion. Breast: Not Present- Breast Mass, Breast Pain, Breast Swelling, Nipple Discharge, Nipple Pain, Recent Breast Size Changes and Skin Changes. Cardiovascular: Not Present- Abnormal Blood Pressure, Chest Pain, Edema, Fainting / Blacking Out, Palpitations, Shortness of Breath and Swelling of Extremities.   Gastrointestinal: Not Present- Abdominal Pain, Abdominal Swelling, Bloating, Change in Bowel Habits, Constipation, Diarrhea, Difficulty Swallowing, Gets full quickly at meals, Nausea, Rectal Bleeding and Vomiting. Female Genitourinary: Not Present- Dysmenorrhea, Dyspareunia, Decreased libido, Excessive Menstrual Bleeding, Menstrual Irregularities, Pelvic Pain, Urinary Complaints, Vaginal Discharge, Vaginal itching/burning, Vaginal odor  Musculoskeletal: Not Present- Joint Pain and Muscle Pain. Neurological: Not Present- Dizziness, Fainting, Headaches and Seizures. Psychiatric: Not Present- Anxiety, Depression, Mood changes and Panic Attacks. Endocrine: Not Present- Appetite Changes, Cold Intolerance, Excessive Thirst, Excessive Urination and Heat Intolerance. Hematology: Not Present- Abnormal Bleeding, Easy Bruising and Enlarged Lymph Nodes. PHYSICAL EXAM:    /80   Ht 5' 3\" (1.6 m)   Wt 115 lb (52.2 kg)   BMI 20.37 kg/m²     Physical Exam   General   Mental Status - Alert. General Appearance - Cooperative. Abdomen   Inspection: - Inspection Normal.   Palpation/Percussion: Palpation and Percussion of the abdomen reveal - Non Tender, No Rebound tenderness, No Rigidity (guarding), No hepatosplenomegaly, No Palpable abdominal masses and Soft. Auscultation: Auscultation of the abdomen reveals - Bowel sounds normal.     Female Genitourinary     External Genitalia  Hypopigmented vulva extending down to the rectum. Agglutination of clitoral lemos  Vulva: - Normal. Perineum - Normal. Bartholin's Gland - Bilateral - Normal. Clitoris - Normal.   Introitus: Characteristics - Normal.   Urethra: Characteristics - Normal.     Speculum & Bimanual   Vagina: Vaginal Mucosa - Normal.   Vaginal Wall: - Normal.   Vaginal Lesions - None. Cervix: Characteristics - Normal.   Uterus: Characteristics - Normal.   Adnexa: - Normal.   Bladder - Normal.     Lymphatics  No cervical, axillary or groin adenopathy          Medical problems and test results were reviewed with the patient today. ASSESSMENT and PLAN    1. Lichen sclerosus  -     clobetasol (TEMOVATE) 0.05 % cream; Apply topically 2-3 times daily. , Disp-60 g, R-5, Normal           Time:  I spent  30 minutes in preparing to see patient (including chart review and preparation), obtaining and/or reviewing additional medical history, performing a physical exam and evaluation, documenting clinical information in the electronic health record, independently interpreting results, communicating results to patient, family or caregiver, and/or coordinating care. No follow-ups on file.        Daryl La MD

## 2023-02-15 ENCOUNTER — OFFICE VISIT (OUTPATIENT)
Dept: GYNECOLOGY | Age: 78
End: 2023-02-15
Payer: MEDICARE

## 2023-02-15 VITALS
HEIGHT: 63 IN | DIASTOLIC BLOOD PRESSURE: 80 MMHG | BODY MASS INDEX: 20.38 KG/M2 | WEIGHT: 115 LBS | SYSTOLIC BLOOD PRESSURE: 112 MMHG

## 2023-02-15 DIAGNOSIS — L90.0 LICHEN SCLEROSUS: Primary | ICD-10-CM

## 2023-02-15 PROCEDURE — G8428 CUR MEDS NOT DOCUMENT: HCPCS | Performed by: OBSTETRICS & GYNECOLOGY

## 2023-02-15 PROCEDURE — 1090F PRES/ABSN URINE INCON ASSESS: CPT | Performed by: OBSTETRICS & GYNECOLOGY

## 2023-02-15 PROCEDURE — G8420 CALC BMI NORM PARAMETERS: HCPCS | Performed by: OBSTETRICS & GYNECOLOGY

## 2023-02-15 PROCEDURE — 1123F ACP DISCUSS/DSCN MKR DOCD: CPT | Performed by: OBSTETRICS & GYNECOLOGY

## 2023-02-15 PROCEDURE — G8484 FLU IMMUNIZE NO ADMIN: HCPCS | Performed by: OBSTETRICS & GYNECOLOGY

## 2023-02-15 PROCEDURE — G8400 PT W/DXA NO RESULTS DOC: HCPCS | Performed by: OBSTETRICS & GYNECOLOGY

## 2023-02-15 PROCEDURE — 99214 OFFICE O/P EST MOD 30 MIN: CPT | Performed by: OBSTETRICS & GYNECOLOGY

## 2023-02-15 PROCEDURE — 1036F TOBACCO NON-USER: CPT | Performed by: OBSTETRICS & GYNECOLOGY

## 2023-02-15 RX ORDER — ESTRADIOL 0.1 MG/G
1 CREAM VAGINAL
COMMUNITY

## 2023-02-15 RX ORDER — CLOBETASOL PROPIONATE 0.5 MG/G
CREAM TOPICAL
Qty: 60 G | Refills: 5 | Status: SHIPPED | OUTPATIENT
Start: 2023-02-15

## 2023-03-12 NOTE — PROGRESS NOTES
Jo Ann Pardo (:  1945) is a 68 y.o. female,Established patient, here for evaluation of the following chief complaint(s):  Abdominal Pain (8 week chronic abdominal pain and fatigue follow-up. She is requesting a lab test for celiac.) and Anxiety (Pt has been having issues with anxiety lately. )         ASSESSMENT/PLAN:  1. Chronic abdominal pain  2. Chronic fatigue  3. Stress  Normal labs from 2021 include DANIS, anti-smooth muscle antibody, anti-tTG, SPEP, CK level, hepatitis panel, cortisol level, thyroid function tests  Suspect patient symptoms likely due to functional motility disorder/IBS compounded by possible anxiety   Colonoscopy on 2020 with hyperplastic polyp and diverticulosis  Most recent EGD with gastritis and gastric polyps  CT scan from  as well as 2022 unremarkable  Right upper quadrant ultrasound from 6/10/2022 without gallstones or biliary obstruction  Labs from 2022 with normal vitamin D, cortisol, negative acetylcholine binding antibody and blocking antibody, normal TSH  Echo on 2022 with a EF of 60 to 03%, normal diastolic function, mild MR, trivial localized pericardial effusion around the right ventricle  Negative tilt table testing on 2022  EKG from 2022 shows NSR, normal axis, no acute ST segment or T wave abnormalities  Previously discussed case with patient's gastroenterologist who agreed that symptoms more likely functional in nature.   Given chronic nature and failure to improve have since placed referral to [address] for further evaluation  Have counseled patient in the past that functional motility disorders do not have curative treatment and likely have periodic exacerbations or fluctuations that would require supportive care  Given patient has noticed some improvement since stopping bread recheck celiac antibodies  Prior intolerance to trial of sublingual Levsin  Abdominal symptoms improving with use of low-dose famotidine  Suspect component of underlying mood disorder. PHQ-9 score of 7 with AYO-7 score of 9 in October 2022. Have previously recommended maintenance antidepressant/anxiolytic with patient agreeable to a trial.  Prior tongue swelling reported with paroxetine. Therefore we will try venlafaxine given this is a different class of medication, low-dose, advised she can crush and mix with food. Patient to alert provider if any adverse effects experienced    - Celiac Ab tTg DGP TIgA; Future  - venlafaxine (EFFEXOR) 25 MG tablet; Take 1 tablet by mouth daily Crush and mix with food  Dispense: 30 tablet; Refill: 1      Return for f/u 4-6 weeks with Lorenza Grissom or Anita; EOV; nonfasting lab in the next 1-2 weeks . Subjective   SUBJECTIVE/OBJECTIVE:  Patient is a 49-year-old  female who presents to the office today for follow-up. Still endorses chronic fatigue and abdominal pain. Has since seen her gastroenterologist who started her on famotidine 20 mg twice a day with significant improvement in her epigastric discomfort. Wonders after talking with her friend if she may have celiac disease. Has been eating the same bread from [employer] for years. Stopped it as a trial and noticed that she had more energy and overall felt better. Denies prior celiac disease testing. Does not typically consume rice or pasta so is uncertain how this would affect her. Is concerned about some problems with her memory for the past 2 months stating that it takes her some time to recall words or stories that she is trying to tell but she does eventually get the words. No slurred speech or difficulty recalling names of familiar contacts. No issues with driving. Has also recognized that she has been under a significant amount of stress over the past few years and has started stress relief tea. Would be agreeable to trial of a maintenance medication to help with stress.   Due to follow-up with ENT tomorrow for chronic tinnitus. No nausea, vomiting, diarrhea, constipation, melena, hematochezia, recent difficulty swallowing, chest pain or shortness of breath. Review of Systems   Constitutional:  Positive for fatigue. HENT:  Positive for tinnitus. Negative for trouble swallowing. Respiratory:  Negative for shortness of breath. Cardiovascular:  Negative for chest pain. Gastrointestinal:  Positive for abdominal pain. Negative for anal bleeding, blood in stool, constipation, diarrhea, nausea and vomiting. Psychiatric/Behavioral:  The patient is nervous/anxious. Objective   Physical Exam  Vitals reviewed. Constitutional:       General: She is not in acute distress. Appearance: Normal appearance. She is not ill-appearing, toxic-appearing or diaphoretic. HENT:      Head: Normocephalic and atraumatic. Eyes:      General:         Right eye: No discharge. Left eye: No discharge. Extraocular Movements: Extraocular movements intact. Cardiovascular:      Rate and Rhythm: Normal rate and regular rhythm. Heart sounds: No murmur heard. No friction rub. No gallop. Pulmonary:      Effort: Pulmonary effort is normal. No respiratory distress. Breath sounds: No wheezing, rhonchi or rales. Abdominal:      General: Bowel sounds are normal.      Palpations: Abdomen is soft. Tenderness: There is no abdominal tenderness. There is no guarding. Musculoskeletal:      Right lower leg: No edema. Left lower leg: No edema. Skin:     General: Skin is dry. Coloration: Skin is not jaundiced. Neurological:      Mental Status: She is alert. Mental status is at baseline. Psychiatric:         Attention and Perception: Attention normal.         Mood and Affect: Mood normal. Mood is not anxious or depressed. Affect is blunt. Affect is not tearful. Speech: Speech normal. Speech is not rapid and pressured or slurred.          Behavior: Behavior normal. Behavior is not agitated, aggressive or combative. Behavior is cooperative. Thought Content: Thought content normal.                An electronic signature was used to authenticate this note.     --Billy Del Valle, DO

## 2023-03-15 ENCOUNTER — OFFICE VISIT (OUTPATIENT)
Dept: INTERNAL MEDICINE CLINIC | Facility: CLINIC | Age: 78
End: 2023-03-15
Payer: MEDICARE

## 2023-03-15 VITALS
OXYGEN SATURATION: 99 % | HEART RATE: 86 BPM | SYSTOLIC BLOOD PRESSURE: 130 MMHG | WEIGHT: 117 LBS | TEMPERATURE: 97.3 F | DIASTOLIC BLOOD PRESSURE: 80 MMHG | BODY MASS INDEX: 20.73 KG/M2 | HEIGHT: 63 IN

## 2023-03-15 DIAGNOSIS — R53.82 CHRONIC FATIGUE: ICD-10-CM

## 2023-03-15 DIAGNOSIS — R10.9 CHRONIC ABDOMINAL PAIN: Primary | ICD-10-CM

## 2023-03-15 DIAGNOSIS — F43.9 STRESS: ICD-10-CM

## 2023-03-15 DIAGNOSIS — G89.29 CHRONIC ABDOMINAL PAIN: Primary | ICD-10-CM

## 2023-03-15 PROCEDURE — 1036F TOBACCO NON-USER: CPT | Performed by: STUDENT IN AN ORGANIZED HEALTH CARE EDUCATION/TRAINING PROGRAM

## 2023-03-15 PROCEDURE — 99214 OFFICE O/P EST MOD 30 MIN: CPT | Performed by: STUDENT IN AN ORGANIZED HEALTH CARE EDUCATION/TRAINING PROGRAM

## 2023-03-15 PROCEDURE — 1090F PRES/ABSN URINE INCON ASSESS: CPT | Performed by: STUDENT IN AN ORGANIZED HEALTH CARE EDUCATION/TRAINING PROGRAM

## 2023-03-15 PROCEDURE — G8484 FLU IMMUNIZE NO ADMIN: HCPCS | Performed by: STUDENT IN AN ORGANIZED HEALTH CARE EDUCATION/TRAINING PROGRAM

## 2023-03-15 PROCEDURE — G8420 CALC BMI NORM PARAMETERS: HCPCS | Performed by: STUDENT IN AN ORGANIZED HEALTH CARE EDUCATION/TRAINING PROGRAM

## 2023-03-15 PROCEDURE — G8427 DOCREV CUR MEDS BY ELIG CLIN: HCPCS | Performed by: STUDENT IN AN ORGANIZED HEALTH CARE EDUCATION/TRAINING PROGRAM

## 2023-03-15 PROCEDURE — 1123F ACP DISCUSS/DSCN MKR DOCD: CPT | Performed by: STUDENT IN AN ORGANIZED HEALTH CARE EDUCATION/TRAINING PROGRAM

## 2023-03-15 PROCEDURE — G8400 PT W/DXA NO RESULTS DOC: HCPCS | Performed by: STUDENT IN AN ORGANIZED HEALTH CARE EDUCATION/TRAINING PROGRAM

## 2023-03-15 RX ORDER — VENLAFAXINE 25 MG/1
25 TABLET ORAL DAILY
Qty: 30 TABLET | Refills: 1 | Status: SHIPPED | OUTPATIENT
Start: 2023-03-15

## 2023-03-15 RX ORDER — FAMOTIDINE 20 MG/1
1 TABLET, FILM COATED ORAL 2 TIMES DAILY
COMMUNITY
Start: 2023-03-02

## 2023-03-15 SDOH — ECONOMIC STABILITY: FOOD INSECURITY: WITHIN THE PAST 12 MONTHS, THE FOOD YOU BOUGHT JUST DIDN'T LAST AND YOU DIDN'T HAVE MONEY TO GET MORE.: NEVER TRUE

## 2023-03-15 SDOH — ECONOMIC STABILITY: HOUSING INSECURITY
IN THE LAST 12 MONTHS, WAS THERE A TIME WHEN YOU DID NOT HAVE A STEADY PLACE TO SLEEP OR SLEPT IN A SHELTER (INCLUDING NOW)?: NO

## 2023-03-15 SDOH — ECONOMIC STABILITY: INCOME INSECURITY: HOW HARD IS IT FOR YOU TO PAY FOR THE VERY BASICS LIKE FOOD, HOUSING, MEDICAL CARE, AND HEATING?: NOT HARD AT ALL

## 2023-03-15 SDOH — ECONOMIC STABILITY: FOOD INSECURITY: WITHIN THE PAST 12 MONTHS, YOU WORRIED THAT YOUR FOOD WOULD RUN OUT BEFORE YOU GOT MONEY TO BUY MORE.: NEVER TRUE

## 2023-03-15 ASSESSMENT — PATIENT HEALTH QUESTIONNAIRE - PHQ9
SUM OF ALL RESPONSES TO PHQ QUESTIONS 1-9: 0
2. FEELING DOWN, DEPRESSED OR HOPELESS: 0
SUM OF ALL RESPONSES TO PHQ QUESTIONS 1-9: 0
1. LITTLE INTEREST OR PLEASURE IN DOING THINGS: 0
SUM OF ALL RESPONSES TO PHQ QUESTIONS 1-9: 0
SUM OF ALL RESPONSES TO PHQ9 QUESTIONS 1 & 2: 0
SUM OF ALL RESPONSES TO PHQ QUESTIONS 1-9: 0

## 2023-03-15 ASSESSMENT — ENCOUNTER SYMPTOMS
TROUBLE SWALLOWING: 0
SHORTNESS OF BREATH: 0
VOMITING: 0
ABDOMINAL PAIN: 1
CONSTIPATION: 0
NAUSEA: 0
BLOOD IN STOOL: 0
DIARRHEA: 0
ANAL BLEEDING: 0

## 2023-03-16 ENCOUNTER — OFFICE VISIT (OUTPATIENT)
Dept: ENT CLINIC | Age: 78
End: 2023-03-16
Payer: MEDICARE

## 2023-03-16 VITALS
WEIGHT: 117 LBS | RESPIRATION RATE: 17 BRPM | HEIGHT: 63 IN | OXYGEN SATURATION: 99 % | BODY MASS INDEX: 20.73 KG/M2 | HEART RATE: 73 BPM

## 2023-03-16 DIAGNOSIS — R13.14 PHARYNGOESOPHAGEAL DYSPHAGIA: ICD-10-CM

## 2023-03-16 DIAGNOSIS — J34.2 DEVIATED NASAL SEPTUM: ICD-10-CM

## 2023-03-16 DIAGNOSIS — H93.13 TINNITUS OF BOTH EARS: Primary | ICD-10-CM

## 2023-03-16 DIAGNOSIS — R13.10 PILL DYSPHAGIA: ICD-10-CM

## 2023-03-16 DIAGNOSIS — J34.89 NASAL OBSTRUCTION: ICD-10-CM

## 2023-03-16 DIAGNOSIS — H61.21 IMPACTED CERUMEN OF RIGHT EAR: ICD-10-CM

## 2023-03-16 PROCEDURE — G8420 CALC BMI NORM PARAMETERS: HCPCS | Performed by: STUDENT IN AN ORGANIZED HEALTH CARE EDUCATION/TRAINING PROGRAM

## 2023-03-16 PROCEDURE — 1090F PRES/ABSN URINE INCON ASSESS: CPT | Performed by: STUDENT IN AN ORGANIZED HEALTH CARE EDUCATION/TRAINING PROGRAM

## 2023-03-16 PROCEDURE — 69210 REMOVE IMPACTED EAR WAX UNI: CPT | Performed by: STUDENT IN AN ORGANIZED HEALTH CARE EDUCATION/TRAINING PROGRAM

## 2023-03-16 PROCEDURE — 1036F TOBACCO NON-USER: CPT | Performed by: STUDENT IN AN ORGANIZED HEALTH CARE EDUCATION/TRAINING PROGRAM

## 2023-03-16 PROCEDURE — G8484 FLU IMMUNIZE NO ADMIN: HCPCS | Performed by: STUDENT IN AN ORGANIZED HEALTH CARE EDUCATION/TRAINING PROGRAM

## 2023-03-16 PROCEDURE — G8400 PT W/DXA NO RESULTS DOC: HCPCS | Performed by: STUDENT IN AN ORGANIZED HEALTH CARE EDUCATION/TRAINING PROGRAM

## 2023-03-16 PROCEDURE — 1123F ACP DISCUSS/DSCN MKR DOCD: CPT | Performed by: STUDENT IN AN ORGANIZED HEALTH CARE EDUCATION/TRAINING PROGRAM

## 2023-03-16 PROCEDURE — G8427 DOCREV CUR MEDS BY ELIG CLIN: HCPCS | Performed by: STUDENT IN AN ORGANIZED HEALTH CARE EDUCATION/TRAINING PROGRAM

## 2023-03-16 PROCEDURE — 99214 OFFICE O/P EST MOD 30 MIN: CPT | Performed by: STUDENT IN AN ORGANIZED HEALTH CARE EDUCATION/TRAINING PROGRAM

## 2023-03-16 ASSESSMENT — ENCOUNTER SYMPTOMS
DIARRHEA: 0
NAUSEA: 0
CHOKING: 0
SINUS PRESSURE: 0
FACIAL SWELLING: 0
EYE ITCHING: 0
EYE DISCHARGE: 0
SHORTNESS OF BREATH: 0
APNEA: 0
SINUS PAIN: 0
STRIDOR: 0
WHEEZING: 0
TROUBLE SWALLOWING: 1
CONSTIPATION: 0
EYE PAIN: 0
COUGH: 0

## 2023-03-16 NOTE — PROGRESS NOTES
HPI:  Abner Malagon is a 68 y.o. female seen Established   Chief Complaint   Patient presents with    Follow-up     Patient presents today with c/o L sided nasal congestion and difficulty swallowing large pills and food . Patient would like to speak about possible medications to manage or lessen her tinnitus . Currently she is using tinnitus 911 a supplement that she purchased online. 66-year-old female seen for follow-up evaluation today with multiple ENT related concerns. She continues with dysphagia which continues to be an issue with swallowing pills that become stuck. This acutely worsened approximately 3 months ago lingering for a couple months before slow resolution back to her baseline. She is still having some dysphagia as usual for the last few years but not entirely bothering her like it was a couple months ago. She is also had some hoarseness changes to her voice. She was recently having stomach pains almost daily happening in the mornings in the evenings for which she saw local GI physician and was recommended Pepcid once in the evenings which has been helpful for the last couple weeks. She also during the time of her worsening dysphagia had increased nasal congestion more noticeable on the left side. She was always having trouble clearing the left nasal cavity when she would blow her nose. This has since now opened up and improving. She also has had a new onset bilateral high-pitched ringing style tinnitus the last few months. This has not resolved and is described as constant. She denies any otalgia otorrhea dizziness or vertigo and there has been no obvious hearing loss. No recent hearing testing during her adult life. She has been trialing various tinnitus supplements without benefit. Past Medical History, Past Surgical History, Family history, Social History, and Medications were all reviewed with the patient today and updated as necessary.      Allergies Allergen Reactions    Erythromycin Shortness Of Breath    Alendronate Other (See Comments)     SIDE EFFECTS ONLY    Fluocinonide Swelling    Levsin [Hyoscyamine] Other (See Comments)     Soreness, discomfort under the tongue     Paroxetine Hcl Swelling     Tongue swelling       Patient Active Problem List   Diagnosis    Hypokalemia    Fatigue    Breast cancer screening    Encounter for immunization    Colon cancer [facility])    Colon polyp    Exocrine pancreatic insufficiency    Lipid screening    IBS (irritable bowel syndrome)    History of abnormal cervical Pap smear    Elevated ALT measurement    Myalgia    Senile osteoporosis    Weakness generalized    Hyponatremia    Orthostatic intolerance    Emaciated (HCC)       Current Outpatient Medications   Medication Sig    famotidine (PEPCID) 20 MG tablet Take 1 tablet by mouth in the morning and at bedtime    venlafaxine (EFFEXOR) 25 MG tablet Take 1 tablet by mouth daily Crush and mix with food    estradiol (ESTRACE) 0.1 MG/GM vaginal cream Place 1 g vaginally three times a week Externally once daily    clobetasol (TEMOVATE) 0.05 % cream Apply topically 2-3 times daily. meclizine (ANTIVERT) 25 MG CHEW Take 25 mg by mouth 3 times daily as needed    Homeopathic Products (ARNICARE ARTHRITIS PO) Take by mouth    Flax OIL Take by mouth 1 Tablespoon before bedtime    UNABLE TO FIND Stomach Ease Tea    UNABLE TO FIND Take 1 capsule by mouth daily Tinnitus 911 + Vit B12    Acetaminophen 325 MG CAPS Take 1 tablet by mouth as needed    ascorbic acid (VITAMIN C) 500 MG tablet Take 1,000 mg by mouth 2 times daily    vitamin D (CHOLECALCIFEROL) 25 MCG (1000 UT) TABS tablet Take 2,000 Units by mouth 2 times daily    cyanocobalamin 1000 MCG tablet Take 1,000 mcg by mouth Twice a Week     No current facility-administered medications for this visit.        Past Medical History:   Diagnosis Date    Atrophic vaginitis     Colon polyp     History of abnormal cervical Pap smear     S/P cervical conization 2016    Hyponatremia     IBS (irritable bowel syndrome)     Lichen sclerosus 111    Osteoporosis        Past Surgical History:   Procedure Laterality Date    BREAST SURGERY  1968    lumpectomy    COLONOSCOPY  2020    COLONOSCOPY      polyps    GI      GYN      colposcopy    GYN      [facility]    GYN      LEEP    OTHER SURGICAL HISTORY      colonscopy       Social History     Tobacco Use    Smoking status: Former     Packs/day: 1.00     Types: Cigarettes     Quit date: 10/17/1993     Years since quittin.4    Smokeless tobacco: Never   Substance Use Topics    Alcohol use: Not Currently       Family History   Problem Relation Age of Onset    Colon Cancer Neg Hx     Breast Cancer Neg Hx     Lung Disease Father     Cancer Father         Lung    Alzheimer's Disease Mother     Obesity Mother     Cancer Brother         Brain Tumor    Hypertension Mother         ROS:    Review of Systems   Constitutional:  Negative for chills and fever. HENT:  Positive for tinnitus and trouble swallowing. Negative for congestion, facial swelling, hearing loss, nosebleeds, sinus pressure, sinus pain and sneezing. Eyes:  Negative for pain, discharge and itching. Respiratory:  Negative for apnea, cough, choking, shortness of breath, wheezing and stridor. Cardiovascular:  Negative for chest pain. Gastrointestinal:  Negative for constipation, diarrhea and nausea. Endocrine: Negative for polyuria. Genitourinary:  Negative for difficulty urinating and flank pain. Musculoskeletal:  Negative for arthralgias, myalgias and neck stiffness. Skin:  Negative for rash and wound. Neurological:  Negative for dizziness, facial asymmetry and headaches. Hematological:  Negative for adenopathy. Does not bruise/bleed easily. Psychiatric/Behavioral:  Negative for agitation, behavioral problems and confusion.           PHYSICAL EXAM:    Pulse 73   Resp 17   Ht 5' 3\" (1.6 m)   Wt 117 lb (53.1 kg)   SpO2 99% BMI 20.73 kg/m²     Physical Exam  Vitals and nursing note reviewed. Constitutional:       Appearance: Normal appearance. HENT:      Head: Normocephalic and atraumatic. Right Ear: Tympanic membrane, ear canal and external ear normal. There is impacted cerumen. Tympanic membrane is not scarred, perforated, erythematous or retracted. Left Ear: Tympanic membrane, ear canal and external ear normal. There is no impacted cerumen. Tympanic membrane is not scarred, perforated, erythematous or retracted. Nose: Septal deviation present. No congestion or rhinorrhea. Comments: Mild left-sided DNS. Turbinates appropriate size. Minimal inflammation. Mouth/Throat:      Mouth: Mucous membranes are moist.      Pharynx: Oropharynx is clear. No oropharyngeal exudate or posterior oropharyngeal erythema. Eyes:      General: No scleral icterus. Pulmonary:      Effort: Pulmonary effort is normal.   Musculoskeletal:      Cervical back: Normal range of motion and neck supple. No rigidity. Lymphadenopathy:      Cervical: No cervical adenopathy. Skin:     General: Skin is warm and dry. Neurological:      Mental Status: She is alert and oriented to person, place, and time. Psychiatric:         Mood and Affect: Mood normal.         Behavior: Behavior normal.        PROCEDURE: Cerumen removal under binocular microscopy  INDICATIONS: Cerumen impaction  DESCRIPTION: After verbal consent was obtained and a timeout was performed, the otologic microscope was used to visualize both ears. There were normal appearing auricles bilaterally. There was cerumen impaction on the right. Ear cleaning performed under the scope w/ a right angle hook,alligator forceps, and artis suctions. After cleaning, the ear canal skin was healthy and both TMs were intact w/ no perforations. Both middle ear spaces were clear w/ no effusions. The patient tolerated the procedure well and there were no complications.       ASSESSMENT and PLAN        ICD-10-CM    1. Tinnitus of both ears  H93.13       2. Pharyngoesophageal dysphagia  R13.14 FL MODIFIED BARIUM SWALLOW W VIDEO      3. Pill dysphagia  R13.10 FL MODIFIED BARIUM SWALLOW W VIDEO      4. Nasal obstruction  J34.89       5. Deviated nasal septum  J34.2       6. Impacted cerumen of right ear  H61.21 REMOVE IMPACTED EAR WAX          Patient was reassured that her ear exam was unremarkable with the bilateral TMs intact with no perforations retractions or middle ear effusions. We discussed new onset of her tinnitus and the pathophysiology of most typical tinnitus. We discussed that it is most usually secondary to a high-frequency SNHL for which I recommended that she follow-up at the [place] office for comprehensive hearing testing. She has been trialing various supplements for tinnitus for which we discussed have limited data does show benefit in regards to long-term improvement. Long-term dysphagia similar to description of symptoms from last office evaluation 2021. We have again discussed option going forward with a barium swallow study. I will place this order and if symptoms worsen again she will call image scheduling to have this obtained. I can call with results. Temporary left-sided nasal congestion which somewhat improved after couple months. She does have a notable left-sided DNS that appears chronic. We discussed this finding and that saline nasal spray can be utilized if this bothers her again. Follow-up with ENT as needed.     Keri Hernandez,   3/16/2023

## 2023-03-23 DIAGNOSIS — R10.9 CHRONIC ABDOMINAL PAIN: ICD-10-CM

## 2023-03-23 DIAGNOSIS — G89.29 CHRONIC ABDOMINAL PAIN: ICD-10-CM

## 2023-03-27 LAB
GLIADIN PEPTIDE IGA SER-ACNC: 3 UNITS (ref 0–19)
GLIADIN PEPTIDE IGG SER-ACNC: 2 UNITS (ref 0–19)
IGA SERPL-MCNC: 251 MG/DL (ref 64–422)
TTG IGA SER-ACNC: <2 U/ML (ref 0–3)
TTG IGG SER-ACNC: <2 U/ML (ref 0–5)

## 2023-04-18 ENCOUNTER — HOSPITAL ENCOUNTER (EMERGENCY)
Age: 78
Discharge: HOME | End: 2023-04-18
Attending: EMERGENCY MEDICINE
Payer: MEDICARE

## 2023-04-18 ENCOUNTER — APPOINTMENT (OUTPATIENT)
Dept: CT IMAGING | Age: 78
End: 2023-04-18
Payer: MEDICARE

## 2023-04-18 ENCOUNTER — APPOINTMENT (OUTPATIENT)
Dept: GENERAL RADIOLOGY | Age: 78
End: 2023-04-18
Payer: MEDICARE

## 2023-04-18 VITALS
RESPIRATION RATE: 18 BRPM | TEMPERATURE: 98.6 F | WEIGHT: 114 LBS | HEART RATE: 74 BPM | OXYGEN SATURATION: 99 % | HEIGHT: 63 IN | DIASTOLIC BLOOD PRESSURE: 62 MMHG | SYSTOLIC BLOOD PRESSURE: 129 MMHG | BODY MASS INDEX: 20.2 KG/M2

## 2023-04-18 DIAGNOSIS — R42 ORTHOSTATIC DIZZINESS: ICD-10-CM

## 2023-04-18 DIAGNOSIS — R42 LIGHTHEADEDNESS: Primary | ICD-10-CM

## 2023-04-18 DIAGNOSIS — R53.83 FATIGUE, UNSPECIFIED TYPE: ICD-10-CM

## 2023-04-18 LAB
ALBUMIN SERPL-MCNC: 3.9 G/DL (ref 3.2–4.6)
ALBUMIN/GLOB SERPL: 1.2 (ref 0.4–1.6)
ALP SERPL-CCNC: 66 U/L (ref 50–130)
ALT SERPL-CCNC: 25 U/L (ref 12–65)
ANION GAP SERPL CALC-SCNC: 2 MMOL/L (ref 2–11)
AST SERPL-CCNC: 26 U/L (ref 15–37)
BASOPHILS # BLD: 0 K/UL (ref 0–0.2)
BASOPHILS NFR BLD: 1 % (ref 0–2)
BILIRUB SERPL-MCNC: 0.5 MG/DL (ref 0.2–1.1)
BUN SERPL-MCNC: 15 MG/DL (ref 8–23)
CALCIUM SERPL-MCNC: 9.8 MG/DL (ref 8.3–10.4)
CHLORIDE SERPL-SCNC: 104 MMOL/L (ref 101–110)
CO2 SERPL-SCNC: 31 MMOL/L (ref 21–32)
CREAT SERPL-MCNC: 0.58 MG/DL (ref 0.6–1)
DIFFERENTIAL METHOD BLD: ABNORMAL
EKG ATRIAL RATE: 80 BPM
EKG DIAGNOSIS: NORMAL
EKG P AXIS: 87 DEGREES
EKG P-R INTERVAL: 151 MS
EKG Q-T INTERVAL: 374 MS
EKG QRS DURATION: 92 MS
EKG QTC CALCULATION (BAZETT): 440 MS
EKG R AXIS: 79 DEGREES
EKG T AXIS: 71 DEGREES
EKG VENTRICULAR RATE: 83 BPM
EOSINOPHIL # BLD: 0 K/UL (ref 0–0.8)
EOSINOPHIL NFR BLD: 1 % (ref 0.5–7.8)
ERYTHROCYTE [DISTWIDTH] IN BLOOD BY AUTOMATED COUNT: 13 % (ref 11.9–14.6)
GLOBULIN SER CALC-MCNC: 3.2 G/DL (ref 2.8–4.5)
GLUCOSE SERPL-MCNC: 100 MG/DL (ref 65–100)
HCT VFR BLD AUTO: 41.6 % (ref 35.8–46.3)
HGB BLD-MCNC: 13.8 G/DL (ref 11.7–15.4)
IMM GRANULOCYTES # BLD AUTO: 0 K/UL (ref 0–0.5)
IMM GRANULOCYTES NFR BLD AUTO: 0 % (ref 0–5)
LYMPHOCYTES # BLD: 1.2 K/UL (ref 0.5–4.6)
LYMPHOCYTES NFR BLD: 23 % (ref 13–44)
MAGNESIUM SERPL-MCNC: 2.4 MG/DL (ref 1.8–2.4)
MCH RBC QN AUTO: 30.7 PG (ref 26.1–32.9)
MCHC RBC AUTO-ENTMCNC: 33.2 G/DL (ref 31.4–35)
MCV RBC AUTO: 92.7 FL (ref 82–102)
MONOCYTES # BLD: 0.4 K/UL (ref 0.1–1.3)
MONOCYTES NFR BLD: 7 % (ref 4–12)
NEUTS SEG # BLD: 3.4 K/UL (ref 1.7–8.2)
NEUTS SEG NFR BLD: 68 % (ref 43–78)
NRBC # BLD: 0 K/UL (ref 0–0.2)
PLATELET # BLD AUTO: 255 K/UL (ref 150–450)
PMV BLD AUTO: 8.8 FL (ref 9.4–12.3)
POTASSIUM SERPL-SCNC: 3.5 MMOL/L (ref 3.5–5.1)
PROT SERPL-MCNC: 7.1 G/DL (ref 6.3–8.2)
RBC # BLD AUTO: 4.49 M/UL (ref 4.05–5.2)
SODIUM SERPL-SCNC: 137 MMOL/L (ref 133–143)
TSH W FREE THYROID IF ABNORMAL: 0.74 UIU/ML (ref 0.36–3.74)
WBC # BLD AUTO: 5 K/UL (ref 4.3–11.1)

## 2023-04-18 PROCEDURE — 84443 ASSAY THYROID STIM HORMONE: CPT

## 2023-04-18 PROCEDURE — 70450 CT HEAD/BRAIN W/O DYE: CPT

## 2023-04-18 PROCEDURE — 85025 COMPLETE CBC W/AUTO DIFF WBC: CPT

## 2023-04-18 PROCEDURE — 93005 ELECTROCARDIOGRAM TRACING: CPT | Performed by: PHYSICIAN ASSISTANT

## 2023-04-18 PROCEDURE — 71045 X-RAY EXAM CHEST 1 VIEW: CPT

## 2023-04-18 PROCEDURE — 99285 EMERGENCY DEPT VISIT HI MDM: CPT

## 2023-04-18 PROCEDURE — 83735 ASSAY OF MAGNESIUM: CPT

## 2023-04-18 PROCEDURE — 80053 COMPREHEN METABOLIC PANEL: CPT

## 2023-04-18 PROCEDURE — 96360 HYDRATION IV INFUSION INIT: CPT

## 2023-04-18 PROCEDURE — 2580000003 HC RX 258: Performed by: PHYSICIAN ASSISTANT

## 2023-04-18 PROCEDURE — 96361 HYDRATE IV INFUSION ADD-ON: CPT

## 2023-04-18 RX ORDER — 0.9 % SODIUM CHLORIDE 0.9 %
1000 INTRAVENOUS SOLUTION INTRAVENOUS ONCE
Status: COMPLETED | OUTPATIENT
Start: 2023-04-18 | End: 2023-04-18

## 2023-04-18 RX ADMIN — SODIUM CHLORIDE 1000 ML: 9 INJECTION, SOLUTION INTRAVENOUS at 10:41

## 2023-04-18 ASSESSMENT — PAIN - FUNCTIONAL ASSESSMENT: PAIN_FUNCTIONAL_ASSESSMENT: NONE - DENIES PAIN

## 2023-04-18 ASSESSMENT — ENCOUNTER SYMPTOMS
GASTROINTESTINAL NEGATIVE: 1
RESPIRATORY NEGATIVE: 1

## 2023-04-18 NOTE — ED PROVIDER NOTES
Thought Content:  Thought content normal.        Procedures     Procedures    Orders Placed This Encounter   Procedures    XR CHEST PORTABLE    CT Head W/O Contrast    CBC with Auto Differential    CMP    Magnesium    TSH with Reflex    Orthostatic blood pressure and pulse    POCT Urine Dipstick    EKG 12 Lead        Medications   0.9 % sodium chloride bolus (0 mLs IntraVENous Stopped 23 1230)       New Prescriptions    No medications on file        Past Medical History:   Diagnosis Date    Atrophic vaginitis     Colon polyp     History of abnormal cervical Pap smear     S/P cervical conization     Hyponatremia     IBS (irritable bowel syndrome)     Lichen sclerosus 3/45/9357    Osteoporosis         Past Surgical History:   Procedure Laterality Date    BREAST SURGERY  1968    lumpectomy    COLONOSCOPY  2020    COLONOSCOPY      polyps    GI      GYN      colposcopy    GYN      [facility]    GYN      LEEP    OTHER SURGICAL HISTORY      colonscopy        Social History     Socioeconomic History    Marital status:      Spouse name: None    Number of children: None    Years of education: None    Highest education level: None   Tobacco Use    Smoking status: Former     Packs/day: 1.00     Types: Cigarettes     Quit date: 10/17/1993     Years since quittin.5    Smokeless tobacco: Never   Vaping Use    Vaping Use: Never used   Substance and Sexual Activity    Alcohol use: Never    Drug use: No   Social History Narrative    Abuse: Feels safe at home, no history of physical abuse, no history of sexual abuse        Social Determinants of Health     Financial Resource Strain: Low Risk     Difficulty of Paying Living Expenses: Not hard at all   Food Insecurity: No Food Insecurity    Worried About Running Out of Food in the Last Year: Never true    Ran Out of Food in the Last Year: Never true   Transportation Needs: Unknown    Lack of Transportation (Non-Medical): No   Housing Stability: Unknown    Unstable

## 2023-04-18 NOTE — CARE COORDINATION
SW met with patient, confirmed demographic information. Patient lives alone with family living [place] of [place]. She reports that her neighbors and [place] family are her main sources of local support. Patient reports being independent at home, does not use assistive devices to ambulate, and denies any falls. Patient is also established with primary care and is seen regularly. No needs identified from [facility] at this time.      Estefania Mcknight LMSW    [address] [facility]

## 2023-04-18 NOTE — ED TRIAGE NOTES
Complains of dizziness since awaking this am around 0600. Dizziness is still occurring and patient states worsens with movement.

## 2023-05-01 ENCOUNTER — OFFICE VISIT (OUTPATIENT)
Dept: INTERNAL MEDICINE CLINIC | Facility: CLINIC | Age: 78
End: 2023-05-01
Payer: MEDICARE

## 2023-05-01 VITALS
BODY MASS INDEX: 20.2 KG/M2 | TEMPERATURE: 96.6 F | DIASTOLIC BLOOD PRESSURE: 80 MMHG | WEIGHT: 114 LBS | HEIGHT: 63 IN | HEART RATE: 83 BPM | SYSTOLIC BLOOD PRESSURE: 120 MMHG | OXYGEN SATURATION: 99 %

## 2023-05-01 DIAGNOSIS — G89.29 CHRONIC ABDOMINAL PAIN: ICD-10-CM

## 2023-05-01 DIAGNOSIS — F41.9 ANXIETY: Primary | ICD-10-CM

## 2023-05-01 DIAGNOSIS — R10.9 CHRONIC ABDOMINAL PAIN: ICD-10-CM

## 2023-05-01 PROCEDURE — G8400 PT W/DXA NO RESULTS DOC: HCPCS | Performed by: NURSE PRACTITIONER

## 2023-05-01 PROCEDURE — G8427 DOCREV CUR MEDS BY ELIG CLIN: HCPCS | Performed by: NURSE PRACTITIONER

## 2023-05-01 PROCEDURE — 1036F TOBACCO NON-USER: CPT | Performed by: NURSE PRACTITIONER

## 2023-05-01 PROCEDURE — 1090F PRES/ABSN URINE INCON ASSESS: CPT | Performed by: NURSE PRACTITIONER

## 2023-05-01 PROCEDURE — G8420 CALC BMI NORM PARAMETERS: HCPCS | Performed by: NURSE PRACTITIONER

## 2023-05-01 PROCEDURE — 99214 OFFICE O/P EST MOD 30 MIN: CPT | Performed by: NURSE PRACTITIONER

## 2023-05-01 PROCEDURE — 1123F ACP DISCUSS/DSCN MKR DOCD: CPT | Performed by: NURSE PRACTITIONER

## 2023-05-01 ASSESSMENT — ENCOUNTER SYMPTOMS
VOMITING: 0
NAUSEA: 0
DIARRHEA: 0
ABDOMINAL DISTENTION: 1
BLOOD IN STOOL: 0
ABDOMINAL PAIN: 1
SHORTNESS OF BREATH: 0
COUGH: 0
CONSTIPATION: 1

## 2023-05-01 NOTE — PROGRESS NOTES
[facility]      2023    Patient Name: Krystal Blizzard  :  1945      Chief Complaint:  Chief Complaint   Patient presents with    Anxiety         HPI  Patient presents today for follow-up on her anxiety. When seen last in our office on 3/15/23, she was started on venlafaxine 25 mg daily. She says that the pharmacy never called her to tell her it was ready to be picked up, so she never started the medication? She is hesitant to take any medications due to history of unfavorable side effects with various medications in the past. She believes that her chronic abdominal pain is contributing to her anxiety. She is being followed by GI, Dr. Flako Lee. She reports being started on famotidine 20 mg BID a couple of months ago which has helped with her abdominal pain. She will follow-up with GI next week, 23.           Past Medical History:   Diagnosis Date    Atrophic vaginitis     Colon polyp     History of abnormal cervical Pap smear     S/P cervical conization     Hyponatremia     IBS (irritable bowel syndrome)     Lichen sclerosus 3/52/1239    Osteoporosis        Past Surgical History:   Procedure Laterality Date    BREAST SURGERY  1968    lumpectomy    COLONOSCOPY  2020    COLONOSCOPY      polyps    GI      GYN      colposcopy    GYN      [facility]    GYN      LEEP    OTHER SURGICAL HISTORY      colonscopy       Family History   Problem Relation Age of Onset    Colon Cancer Neg Hx     Breast Cancer Neg Hx     Lung Disease Father     Cancer Father         Lung    Alzheimer's Disease Mother     Obesity Mother     Cancer Brother         Brain Tumor    Hypertension Mother        Social History     Tobacco Use    Smoking status: Former     Packs/day: 1.00     Types: Cigarettes     Quit date: 10/17/1993     Years since quittin.5    Smokeless tobacco: Never   Vaping Use    Vaping Use: Never used   Substance Use Topics    Alcohol use: Never    Drug use: No       Current Outpatient

## 2023-06-01 ENCOUNTER — TELEPHONE (OUTPATIENT)
Dept: INTERNAL MEDICINE CLINIC | Facility: CLINIC | Age: 78
End: 2023-06-01

## 2023-06-01 DIAGNOSIS — N64.4 BREAST PAIN, LEFT: Primary | ICD-10-CM

## 2023-06-01 DIAGNOSIS — Z12.31 VISIT FOR SCREENING MAMMOGRAM: Primary | ICD-10-CM

## 2023-06-01 NOTE — TELEPHONE ENCOUNTER
Because pt c/o left breast pain, they are needing a Bilateral Dx Mammogram and Left breast US order. Both ordered.

## 2023-07-13 ENCOUNTER — OFFICE VISIT (OUTPATIENT)
Dept: INTERNAL MEDICINE CLINIC | Facility: CLINIC | Age: 78
End: 2023-07-13
Payer: MEDICARE

## 2023-07-13 VITALS
HEIGHT: 63 IN | OXYGEN SATURATION: 99 % | WEIGHT: 110 LBS | BODY MASS INDEX: 19.49 KG/M2 | HEART RATE: 81 BPM | SYSTOLIC BLOOD PRESSURE: 134 MMHG | TEMPERATURE: 97 F | DIASTOLIC BLOOD PRESSURE: 72 MMHG

## 2023-07-13 DIAGNOSIS — R42 LIGHTHEADEDNESS: ICD-10-CM

## 2023-07-13 DIAGNOSIS — R53.82 CHRONIC FATIGUE: Primary | ICD-10-CM

## 2023-07-13 DIAGNOSIS — E55.9 VITAMIN D INSUFFICIENCY: ICD-10-CM

## 2023-07-13 PROCEDURE — G8427 DOCREV CUR MEDS BY ELIG CLIN: HCPCS | Performed by: NURSE PRACTITIONER

## 2023-07-13 PROCEDURE — 1090F PRES/ABSN URINE INCON ASSESS: CPT | Performed by: NURSE PRACTITIONER

## 2023-07-13 PROCEDURE — G8400 PT W/DXA NO RESULTS DOC: HCPCS | Performed by: NURSE PRACTITIONER

## 2023-07-13 PROCEDURE — G8420 CALC BMI NORM PARAMETERS: HCPCS | Performed by: NURSE PRACTITIONER

## 2023-07-13 PROCEDURE — 1123F ACP DISCUSS/DSCN MKR DOCD: CPT | Performed by: NURSE PRACTITIONER

## 2023-07-13 PROCEDURE — 99214 OFFICE O/P EST MOD 30 MIN: CPT | Performed by: NURSE PRACTITIONER

## 2023-07-13 PROCEDURE — 1036F TOBACCO NON-USER: CPT | Performed by: NURSE PRACTITIONER

## 2023-07-13 RX ORDER — TIMOLOL MALEATE 5 MG/ML
SOLUTION/ DROPS OPHTHALMIC
COMMUNITY
Start: 2023-06-20

## 2023-07-13 RX ORDER — FAMOTIDINE 20 MG/1
20 TABLET, FILM COATED ORAL 2 TIMES DAILY
COMMUNITY
Start: 2023-07-07

## 2023-07-13 ASSESSMENT — ENCOUNTER SYMPTOMS
SHORTNESS OF BREATH: 0
VOMITING: 0
SORE THROAT: 0
BACK PAIN: 0
DIARRHEA: 0
NAUSEA: 0
COUGH: 0
CONSTIPATION: 0
ABDOMINAL PAIN: 0

## 2023-07-13 NOTE — PROGRESS NOTES
[facility]      2023    Patient Name: Juliet Brannon  :  1945      Chief Complaint:  Chief Complaint   Patient presents with    Fatigue     Patient c/o fatigue for years. Dizziness     Patient c/o intermittent dizziness and has to brace herself on the wall. States episodes are very short. Other     Patient states she gets up in the morning and feels okay but then it hits her and she is lightheaded. HPI  Patient presents today with complaint of chronic fatigue. This has been a problem for a couple of years and has been evaluated several times. She reports \"feeling hot\" at times but denies any recorded fever. She states, \"I feel sick. That's the only way I can describe it. \" Also reports that she feels weak and drained most of the time. Taking two naps day and then sleeping through the night as well. Poor appetite. She also reports intermittent lightheadedness for several months. Occurs several times weekly. Sometimes occur with position changes but then occurs on exertion at times as well. Reports that she has to brace herself on the wall until the symptoms resolve. Seen in the ER in April for this problem. EKG showed sinus rhythm with no acute ischemic changes. Orthostatics negative. Labs, chest x-ray and head CT unremarkable. IV fluids significantly improved the symptoms. She denies any lightheadedness at all this week. Occasional dyspnea on exertion. No CP. No syncope. Seen by cardiology last year. Echo on 2022 with a EF of 60 to 02%, normal diastolic function, mild MR, trivial localized pericardial effusion around the right ventricle. Negative tilt table testing on 2022. Was started on Effexor in March for anxiety. Took it daily \"for a while\" but is no longer taking it. Does not feel she needs it anymore. She reports, \"I'm not depressed\".          Past Medical History:   Diagnosis Date    Atrophic vaginitis     Colon polyp     History of

## 2023-07-14 ENCOUNTER — TELEPHONE (OUTPATIENT)
Dept: INTERNAL MEDICINE CLINIC | Facility: CLINIC | Age: 78
End: 2023-07-14

## 2023-07-14 DIAGNOSIS — R53.82 CHRONIC FATIGUE: Primary | ICD-10-CM

## 2023-07-14 DIAGNOSIS — R40.0 DAYTIME SOMNOLENCE: ICD-10-CM

## 2023-07-14 NOTE — TELEPHONE ENCOUNTER
Please call patient to let her know that I spoke with Dr. Kaleigh Mo regarding her lightheadedness and fatigue. I am going to order some lab work to be done prior to your appt with him on 8/1/23. We can also offer referral for a sleep study if she has not had one to rule out sleep apnea as possible cause for her chronic fatigue.

## 2023-07-14 NOTE — TELEPHONE ENCOUNTER
Called and advised patient, per David Carvajal NP, that I spoke with Dr. Peggy Gottron regarding her lightheadedness and fatigue. I am going to order some lab work to be done prior to your appt with him on 8/1/23. We can also offer referral for a sleep study if she has not had one to rule out sleep apnea as possible cause for her chronic fatigue. Patient agreeable to sleep referral and lab appointment scheduled.

## 2023-07-23 NOTE — PROGRESS NOTES
Suzanne Hall (:  1945) is a 66 y.o. female,Established patient, here for evaluation of the following chief complaint(s):  Fatigue (3 Month Follow Up) and Abdominal Pain         ASSESSMENT/PLAN:  1. Chronic abdominal pain  2. Chronic fatigue  3. Chronic constipation   Normal labs from 2021 include DANIS, anti-smooth muscle antibody, anti-tTG, SPEP, CK level, hepatitis panel, cortisol level, thyroid function tests  Colonoscopy on 2020 with hyperplastic polyp and diverticulosis  Most recent EGD with gastritis and gastric polyps  CT scan from  as well as 2022 unremarkable  Right upper quadrant ultrasound from 6/10/2022 without gallstones or biliary obstruction  Labs from 2022 with normal vitamin D, cortisol, negative acetylcholine binding antibody and blocking antibody, normal TSH  Prior testing for celiac disease negative  Echo on 2022 with a EF of 60 to 15%, normal diastolic function, mild MR, trivial localized pericardial effusion around the right ventricle  Negative tilt table testing on 2022  EKG from 2022 shows NSR, normal axis, no acute ST segment or T wave abnormalities  Previously discussed case with patient's gastroenterologist who agreed that symptoms more likely functional in nature. Given chronic nature and failure to improve I previously placed referral to [address] for further evaluation at tertiary center but patient states she never heard back  Suspect patient symptoms likely due to functional motility disorder/IBS compounded by gastritis and chronic constipation with possibility for concurrent mood disorder  Have counseled patient in the past that functional motility disorders do not have curative treatment and likely have periodic exacerbations or fluctuations that would require supportive care  Intolerance to sublingual Levsin  Currently on famotidine, advised to take prior to meals twice a day instead of after.   Also taking IBgard  PHQ-9

## 2023-07-24 ENCOUNTER — OFFICE VISIT (OUTPATIENT)
Dept: INTERNAL MEDICINE CLINIC | Facility: CLINIC | Age: 78
End: 2023-07-24
Payer: MEDICARE

## 2023-07-24 VITALS
OXYGEN SATURATION: 98 % | HEART RATE: 76 BPM | HEIGHT: 63 IN | TEMPERATURE: 97.9 F | RESPIRATION RATE: 16 BRPM | WEIGHT: 111.8 LBS | DIASTOLIC BLOOD PRESSURE: 74 MMHG | SYSTOLIC BLOOD PRESSURE: 124 MMHG | BODY MASS INDEX: 19.81 KG/M2

## 2023-07-24 DIAGNOSIS — K59.09 CHRONIC CONSTIPATION: ICD-10-CM

## 2023-07-24 DIAGNOSIS — R53.82 CHRONIC FATIGUE: ICD-10-CM

## 2023-07-24 DIAGNOSIS — G89.29 CHRONIC ABDOMINAL PAIN: Primary | ICD-10-CM

## 2023-07-24 DIAGNOSIS — R10.9 CHRONIC ABDOMINAL PAIN: Primary | ICD-10-CM

## 2023-07-24 PROCEDURE — 1123F ACP DISCUSS/DSCN MKR DOCD: CPT | Performed by: STUDENT IN AN ORGANIZED HEALTH CARE EDUCATION/TRAINING PROGRAM

## 2023-07-24 PROCEDURE — G8427 DOCREV CUR MEDS BY ELIG CLIN: HCPCS | Performed by: STUDENT IN AN ORGANIZED HEALTH CARE EDUCATION/TRAINING PROGRAM

## 2023-07-24 PROCEDURE — 99215 OFFICE O/P EST HI 40 MIN: CPT | Performed by: STUDENT IN AN ORGANIZED HEALTH CARE EDUCATION/TRAINING PROGRAM

## 2023-07-24 PROCEDURE — 1036F TOBACCO NON-USER: CPT | Performed by: STUDENT IN AN ORGANIZED HEALTH CARE EDUCATION/TRAINING PROGRAM

## 2023-07-24 PROCEDURE — G8420 CALC BMI NORM PARAMETERS: HCPCS | Performed by: STUDENT IN AN ORGANIZED HEALTH CARE EDUCATION/TRAINING PROGRAM

## 2023-07-24 PROCEDURE — 1090F PRES/ABSN URINE INCON ASSESS: CPT | Performed by: STUDENT IN AN ORGANIZED HEALTH CARE EDUCATION/TRAINING PROGRAM

## 2023-07-24 PROCEDURE — G8400 PT W/DXA NO RESULTS DOC: HCPCS | Performed by: STUDENT IN AN ORGANIZED HEALTH CARE EDUCATION/TRAINING PROGRAM

## 2023-07-24 ASSESSMENT — ENCOUNTER SYMPTOMS
NAUSEA: 0
CONSTIPATION: 1
VOMITING: 0
ABDOMINAL PAIN: 1
BLOOD IN STOOL: 0
ANAL BLEEDING: 0

## 2023-07-28 ENCOUNTER — HOSPITAL ENCOUNTER (OUTPATIENT)
Dept: LAB | Age: 78
Discharge: HOME | End: 2023-07-31

## 2023-07-28 DIAGNOSIS — R42 LIGHTHEADEDNESS: ICD-10-CM

## 2023-07-28 DIAGNOSIS — R53.82 CHRONIC FATIGUE: ICD-10-CM

## 2023-07-28 DIAGNOSIS — E55.9 VITAMIN D INSUFFICIENCY: ICD-10-CM

## 2023-07-28 LAB
ALBUMIN SERPL-MCNC: 3.9 G/DL (ref 3.2–4.6)
ALBUMIN/GLOB SERPL: 1.2 (ref 0.4–1.6)
ALP SERPL-CCNC: 65 U/L (ref 50–136)
ALT SERPL-CCNC: 23 U/L (ref 12–65)
ANION GAP SERPL CALC-SCNC: 3 MMOL/L (ref 2–11)
AST SERPL-CCNC: 22 U/L (ref 15–37)
BASOPHILS # BLD: 0.1 K/UL (ref 0–0.2)
BASOPHILS NFR BLD: 1 % (ref 0–2)
BILIRUB SERPL-MCNC: 0.5 MG/DL (ref 0.2–1.1)
BUN SERPL-MCNC: 9 MG/DL (ref 8–23)
CALCIUM SERPL-MCNC: 9.8 MG/DL (ref 8.3–10.4)
CHLORIDE SERPL-SCNC: 108 MMOL/L (ref 101–110)
CO2 SERPL-SCNC: 31 MMOL/L (ref 21–32)
CREAT SERPL-MCNC: 0.7 MG/DL (ref 0.6–1)
DIFFERENTIAL METHOD BLD: ABNORMAL
EOSINOPHIL # BLD: 0.1 K/UL (ref 0–0.8)
EOSINOPHIL NFR BLD: 2 % (ref 0.5–7.8)
ERYTHROCYTE [DISTWIDTH] IN BLOOD BY AUTOMATED COUNT: 13.2 % (ref 11.9–14.6)
GLOBULIN SER CALC-MCNC: 3.3 G/DL (ref 2.8–4.5)
GLUCOSE SERPL-MCNC: 100 MG/DL (ref 65–100)
HCT VFR BLD AUTO: 44.3 % (ref 35.8–46.3)
HGB BLD-MCNC: 14.3 G/DL (ref 11.7–15.4)
IMM GRANULOCYTES # BLD AUTO: 0 K/UL (ref 0–0.5)
IMM GRANULOCYTES NFR BLD AUTO: 0 % (ref 0–5)
LYMPHOCYTES # BLD: 1.7 K/UL (ref 0.5–4.6)
LYMPHOCYTES NFR BLD: 35 % (ref 13–44)
MCH RBC QN AUTO: 31 PG (ref 26.1–32.9)
MCHC RBC AUTO-ENTMCNC: 32.3 G/DL (ref 31.4–35)
MCV RBC AUTO: 95.9 FL (ref 82–102)
MONOCYTES # BLD: 0.5 K/UL (ref 0.1–1.3)
MONOCYTES NFR BLD: 10 % (ref 4–12)
NEUTS SEG # BLD: 2.6 K/UL (ref 1.7–8.2)
NEUTS SEG NFR BLD: 52 % (ref 43–78)
NRBC # BLD: 0 K/UL (ref 0–0.2)
PLATELET # BLD AUTO: 293 K/UL (ref 150–450)
PMV BLD AUTO: 9.3 FL (ref 9.4–12.3)
POTASSIUM SERPL-SCNC: 4 MMOL/L (ref 3.5–5.1)
PROT SERPL-MCNC: 7.2 G/DL (ref 6.3–8.2)
RBC # BLD AUTO: 4.62 M/UL (ref 4.05–5.2)
SODIUM SERPL-SCNC: 142 MMOL/L (ref 133–143)
TSH, 3RD GENERATION: 0.96 UIU/ML (ref 0.36–3.74)
WBC # BLD AUTO: 5 K/UL (ref 4.3–11.1)

## 2023-07-29 LAB — 25(OH)D3 SERPL-MCNC: 23.5 NG/ML (ref 30–100)

## 2023-08-15 ENCOUNTER — TELEPHONE (OUTPATIENT)
Dept: INTERNAL MEDICINE CLINIC | Facility: CLINIC | Age: 78
End: 2023-08-15

## 2023-08-15 NOTE — TELEPHONE ENCOUNTER
Pt called w/concerns of dizziness and feels like passing out. Pt declined ED recommendation per MA from triage call, requ appt. Appt made for 8/25.

## 2023-08-25 ENCOUNTER — OFFICE VISIT (OUTPATIENT)
Dept: INTERNAL MEDICINE CLINIC | Facility: CLINIC | Age: 78
End: 2023-08-25

## 2023-08-25 VITALS
BODY MASS INDEX: 19.87 KG/M2 | HEIGHT: 63 IN | WEIGHT: 112.13 LBS | SYSTOLIC BLOOD PRESSURE: 130 MMHG | HEART RATE: 75 BPM | TEMPERATURE: 97.2 F | OXYGEN SATURATION: 98 % | DIASTOLIC BLOOD PRESSURE: 70 MMHG

## 2023-08-25 DIAGNOSIS — R42 EPISODIC LIGHTHEADEDNESS: Primary | ICD-10-CM

## 2023-08-25 ASSESSMENT — ENCOUNTER SYMPTOMS
VOMITING: 0
NAUSEA: 0
COUGH: 0
DIARRHEA: 0
ABDOMINAL PAIN: 1
CONSTIPATION: 1
SORE THROAT: 0
WHEEZING: 0
SHORTNESS OF BREATH: 0

## 2023-08-25 NOTE — PROGRESS NOTES
Skin is warm and dry. Neurological:      Mental Status: She is alert and oriented to person, place, and time. Cranial Nerves: No cranial nerve deficit. Motor: No weakness. Coordination: Coordination normal.      Gait: Gait normal.   Psychiatric:         Mood and Affect: Mood normal.       EKG: normal EKG, normal sinus rhythm, unchanged from previous tracings. Assessment/Plan:    Kalin Scott was seen today for dizziness. Diagnoses and all orders for this visit:    Episodic lightheadedness  -     EKG 12 Lead    EKG normal in the office today. Lab work from 7/28/23 reviewed. Patient instructed to exercise legs prior to standing up and to change positions slowly. Increase water intake. Monitor BP and HR at home. Keep log and bring to next appt. Buy glucose meter and check blood sugar when symptoms occur. Follow-up and Dispositions    Return in about 3 weeks (around 9/15/2023) for reevaluation of symptoms. On this date, 8/26/23, I have spent 35 minutes reviewing previous notes, test results and face to face with the patient discussing the diagnosis and importance of compliance with the treatment plan as well as documenting on the day of the visit. An electronic signature was used to authenticate this note.   RUT Frost

## 2023-08-28 ENCOUNTER — NURSE ONLY (OUTPATIENT)
Dept: INTERNAL MEDICINE CLINIC | Facility: CLINIC | Age: 78
End: 2023-08-28

## 2023-09-03 ENCOUNTER — APPOINTMENT (OUTPATIENT)
Dept: CT IMAGING | Age: 78
End: 2023-09-03
Payer: MEDICARE

## 2023-09-03 ENCOUNTER — HOSPITAL ENCOUNTER (EMERGENCY)
Age: 78
Discharge: HOME | End: 2023-09-03
Attending: EMERGENCY MEDICINE
Payer: MEDICARE

## 2023-09-03 VITALS
RESPIRATION RATE: 18 BRPM | HEART RATE: 82 BPM | SYSTOLIC BLOOD PRESSURE: 107 MMHG | HEIGHT: 64 IN | BODY MASS INDEX: 20.49 KG/M2 | WEIGHT: 120 LBS | OXYGEN SATURATION: 97 % | TEMPERATURE: 98.6 F | DIASTOLIC BLOOD PRESSURE: 68 MMHG

## 2023-09-03 DIAGNOSIS — G44.209 ACUTE NON INTRACTABLE TENSION-TYPE HEADACHE: Primary | ICD-10-CM

## 2023-09-03 LAB
ALBUMIN SERPL-MCNC: 3.5 G/DL (ref 3.2–4.6)
ALBUMIN/GLOB SERPL: 1.1 (ref 0.4–1.6)
ALP SERPL-CCNC: 59 U/L (ref 50–136)
ALT SERPL-CCNC: 23 U/L (ref 12–65)
ANION GAP SERPL CALC-SCNC: 2 MMOL/L (ref 2–11)
AST SERPL-CCNC: 22 U/L (ref 15–37)
BASOPHILS # BLD: 0 K/UL (ref 0–0.2)
BASOPHILS NFR BLD: 0 % (ref 0–2)
BILIRUB SERPL-MCNC: 0.6 MG/DL (ref 0.2–1.1)
BUN SERPL-MCNC: 10 MG/DL (ref 8–23)
CALCIUM SERPL-MCNC: 9.7 MG/DL (ref 8.3–10.4)
CHLORIDE SERPL-SCNC: 106 MMOL/L (ref 101–110)
CO2 SERPL-SCNC: 31 MMOL/L (ref 21–32)
CREAT SERPL-MCNC: 0.63 MG/DL (ref 0.6–1)
DIFFERENTIAL METHOD BLD: ABNORMAL
EOSINOPHIL # BLD: 0 K/UL (ref 0–0.8)
EOSINOPHIL NFR BLD: 0 % (ref 0.5–7.8)
ERYTHROCYTE [DISTWIDTH] IN BLOOD BY AUTOMATED COUNT: 12.9 % (ref 11.9–14.6)
ERYTHROCYTE [SEDIMENTATION RATE] IN BLOOD: 1 MM/HR (ref 0–30)
GLOBULIN SER CALC-MCNC: 3.3 G/DL (ref 2.8–4.5)
GLUCOSE SERPL-MCNC: 117 MG/DL (ref 65–100)
HCT VFR BLD AUTO: 41.2 % (ref 35.8–46.3)
HGB BLD-MCNC: 13.7 G/DL (ref 11.7–15.4)
IMM GRANULOCYTES # BLD AUTO: 0 K/UL (ref 0–0.5)
IMM GRANULOCYTES NFR BLD AUTO: 0 % (ref 0–5)
LYMPHOCYTES # BLD: 1 K/UL (ref 0.5–4.6)
LYMPHOCYTES NFR BLD: 10 % (ref 13–44)
MCH RBC QN AUTO: 30.6 PG (ref 26.1–32.9)
MCHC RBC AUTO-ENTMCNC: 33.3 G/DL (ref 31.4–35)
MCV RBC AUTO: 92.2 FL (ref 82–102)
MONOCYTES # BLD: 0.6 K/UL (ref 0.1–1.3)
MONOCYTES NFR BLD: 6 % (ref 4–12)
NEUTS SEG # BLD: 8.4 K/UL (ref 1.7–8.2)
NEUTS SEG NFR BLD: 84 % (ref 43–78)
NRBC # BLD: 0 K/UL (ref 0–0.2)
PLATELET # BLD AUTO: 246 K/UL (ref 150–450)
PMV BLD AUTO: 8.6 FL (ref 9.4–12.3)
POTASSIUM SERPL-SCNC: 3.6 MMOL/L (ref 3.5–5.1)
PROT SERPL-MCNC: 6.8 G/DL (ref 6.3–8.2)
RBC # BLD AUTO: 4.47 M/UL (ref 4.05–5.2)
SODIUM SERPL-SCNC: 139 MMOL/L (ref 133–143)
WBC # BLD AUTO: 10 K/UL (ref 4.3–11.1)

## 2023-09-03 PROCEDURE — 80053 COMPREHEN METABOLIC PANEL: CPT

## 2023-09-03 PROCEDURE — 96375 TX/PRO/DX INJ NEW DRUG ADDON: CPT

## 2023-09-03 PROCEDURE — 96365 THER/PROPH/DIAG IV INF INIT: CPT

## 2023-09-03 PROCEDURE — 6360000002 HC RX W HCPCS: Performed by: STUDENT IN AN ORGANIZED HEALTH CARE EDUCATION/TRAINING PROGRAM

## 2023-09-03 PROCEDURE — 70450 CT HEAD/BRAIN W/O DYE: CPT

## 2023-09-03 PROCEDURE — 99284 EMERGENCY DEPT VISIT MOD MDM: CPT

## 2023-09-03 PROCEDURE — 85652 RBC SED RATE AUTOMATED: CPT

## 2023-09-03 PROCEDURE — 85025 COMPLETE CBC W/AUTO DIFF WBC: CPT

## 2023-09-03 RX ORDER — LORAZEPAM 2 MG/ML
0.5 INJECTION INTRAMUSCULAR ONCE
Status: COMPLETED | OUTPATIENT
Start: 2023-09-03 | End: 2023-09-03

## 2023-09-03 RX ORDER — METOCLOPRAMIDE HYDROCHLORIDE 5 MG/ML
10 INJECTION INTRAMUSCULAR; INTRAVENOUS ONCE
Status: COMPLETED | OUTPATIENT
Start: 2023-09-03 | End: 2023-09-03

## 2023-09-03 RX ORDER — MAGNESIUM SULFATE IN WATER 40 MG/ML
2000 INJECTION, SOLUTION INTRAVENOUS ONCE
Status: COMPLETED | OUTPATIENT
Start: 2023-09-03 | End: 2023-09-03

## 2023-09-03 RX ORDER — DIPHENHYDRAMINE HYDROCHLORIDE 50 MG/ML
12.5 INJECTION INTRAMUSCULAR; INTRAVENOUS
Status: COMPLETED | OUTPATIENT
Start: 2023-09-03 | End: 2023-09-03

## 2023-09-03 RX ORDER — KETOROLAC TROMETHAMINE 15 MG/ML
15 INJECTION, SOLUTION INTRAMUSCULAR; INTRAVENOUS ONCE
Status: COMPLETED | OUTPATIENT
Start: 2023-09-03 | End: 2023-09-03

## 2023-09-03 RX ADMIN — DIPHENHYDRAMINE HYDROCHLORIDE 12.5 MG: 50 INJECTION INTRAMUSCULAR; INTRAVENOUS at 10:56

## 2023-09-03 RX ADMIN — LORAZEPAM 0.5 MG: 2 INJECTION INTRAMUSCULAR; INTRAVENOUS at 10:57

## 2023-09-03 RX ADMIN — METOCLOPRAMIDE 10 MG: 5 INJECTION, SOLUTION INTRAMUSCULAR; INTRAVENOUS at 10:57

## 2023-09-03 RX ADMIN — KETOROLAC TROMETHAMINE 15 MG: 15 INJECTION, SOLUTION INTRAMUSCULAR; INTRAVENOUS at 10:57

## 2023-09-03 RX ADMIN — MAGNESIUM SULFATE HEPTAHYDRATE 2000 MG: 40 INJECTION, SOLUTION INTRAVENOUS at 11:06

## 2023-09-03 ASSESSMENT — ENCOUNTER SYMPTOMS
VOMITING: 0
COUGH: 0
ABDOMINAL PAIN: 0
FACIAL SWELLING: 0
TROUBLE SWALLOWING: 0
PHOTOPHOBIA: 0
SHORTNESS OF BREATH: 0

## 2023-09-03 ASSESSMENT — PAIN DESCRIPTION - LOCATION: LOCATION: HEAD

## 2023-09-03 ASSESSMENT — LIFESTYLE VARIABLES
HOW OFTEN DO YOU HAVE A DRINK CONTAINING ALCOHOL: NEVER
HOW MANY STANDARD DRINKS CONTAINING ALCOHOL DO YOU HAVE ON A TYPICAL DAY: PATIENT DOES NOT DRINK

## 2023-09-03 ASSESSMENT — PAIN - FUNCTIONAL ASSESSMENT: PAIN_FUNCTIONAL_ASSESSMENT: 0-10

## 2023-09-03 ASSESSMENT — PAIN SCALES - GENERAL: PAINLEVEL_OUTOF10: 10

## 2023-09-03 NOTE — ED NOTES
Pt slightly unsteady while trying to ambulate. Provider notified. Pt placed in wheelchair in lobby to await medical vehicle to assist her back home. Pt not driving home by herself.       Carole Rodriguez RN  09/03/23 [id number] 21-May-2017 05:14

## 2023-09-03 NOTE — ED PROVIDER NOTES
The Female patient is 66years old  presenting for diffuse  head pain without history of headaches. Technique: Thin slice axial CT images through the brain were obtained. All CT scans at this facility are performed using dose reduction/dose modulation  techniques, as appropriate the performed exam, including the following:   Automated Exposure Control; Adjustment of the mA and/or kV according to patient  size (this includes techniques or standardized protocols for targeted exams  where dose is matched to indication/reason for exam); and Use of Iterative  Reconstruction Technique. Radiation Exposure Indices:  Reference Air Kerma (Mattie Sequeira) = 1035 mGy-cm    Comparison:  Head CT 4/18/2023. Findings:      Cerebrum: Age-related senescent changes are seen with sulcal and ventricular  prominence. . There is normal gray-white matter differentiation. No evidence of  intracranial hemorrhage, mass, or other space-occupying lesion is seen. There  are no abnormal extra-axial fluid collections. Cerebellum: Involutional changes are demonstrated. CSF spaces: The ventricular system is within normal limits. The basilar cisterns  are unremarkable. Brainstem: No evidence of ischemia, hemorrhage, or mass. Extracranial tissues: Visualized orbits and extracranial soft tissues are  unremarkable. Paranasal sinuses/Mastoids: Well-pneumatized and aerated. .    Calvarium: No acute osseous abnormality. Impression    1. No acute intracranial abnormality.       CPT code 33220   CBC with Auto Differential   Result Value Ref Range    WBC 10.0 4.3 - 11.1 K/uL    RBC 4.47 4.05 - 5.2 M/uL    Hemoglobin 13.7 11.7 - 15.4 g/dL    Hematocrit 41.2 35.8 - 46.3 %    MCV 92.2 82.0 - 102.0 FL    MCH 30.6 26.1 - 32.9 PG    MCHC 33.3 31.4 - 35.0 g/dL    RDW 12.9 11.9 - 14.6 %    Platelets 100 105 - 551 K/uL    MPV 8.6 (L) 9.4 - 12.3 FL    nRBC 0.00 0.0 - 0.2 K/uL    Differential Type AUTOMATED      Neutrophils % 84 (H) 43 - 78 %

## 2023-09-03 NOTE — ED NOTES
Pt's O2 sats dropped to high 80s after administration of medications. Pt placed on 4L NC. Provider notified.       Carina Palma RN  09/03/23 [id number]

## 2023-09-03 NOTE — ED TRIAGE NOTES
Pt arrvies via EMS from home. Pt CO headache since last night that radiates down the back of her neck. Pt denies injuries. Pt A&Ox4.      124/68  82 HR  97.7F  98% RA

## 2023-09-03 NOTE — ED NOTES
Faraz Ziegler spoke with Braden-ex spouse who is listed as contact, states pt is safe to go home and car is secured in the garage and that he will ensure that she arrives and will call her. Radha Gallegos called for patient.      Chrissy Rivera RN  09/03/23 0875

## 2023-09-03 NOTE — DISCHARGE INSTRUCTIONS
Your work-up today was reassuring. Your CAT scan did not reveal any abnormalities. Your lab work was stable. Please return for new or worsening symptoms. Otherwise please follow-up with family doctor. As we discussed, I did not find a life threatening cause of your symptoms today. However, THAT DOES NOT MEAN IT COULD NOT DEVELOP. If you develop ANY new or worsening symptoms, it is critical that you return for re-evaluation. This includes any symptoms that are concerning to you, especially symptoms such as worst headache of life, headache sudden onset, fevers, neck stiffness, vomiting, unilateral weakness or numbness. If you do not return for re-evaluation, you risk serious complications, including death.

## 2023-09-03 NOTE — ED NOTES
Pt taken off of O2. Pt tolerating well. Pt's sat 99%-100% on RA.      Jeanmarie Meyers RN  09/03/23 6014

## 2023-09-05 ENCOUNTER — CARE COORDINATION (OUTPATIENT)
Dept: CARE COORDINATION | Facility: CLINIC | Age: 78
End: 2023-09-05

## 2023-09-05 NOTE — CARE COORDINATION
Ambulatory Care Coordination Note  2023    Patient Current Location:  [facility]    ACM contacted the patient by telephone. Verified name and  with patient as identifiers. Provided introduction to self, and explanation of the ACM role. ACM: Rossy Whitley RN    Challenges to be reviewed by the provider   Additional needs identified to be addressed with provider: No  none               Method of communication with provider: phone. Pt interested in ACM services after her ED visit. Expresses concerns about her \"overall arin\" and that she would like to know Vesta Bread is going on with me\". Pt lives alone, very inde, drives and her son lives a few hrs away. Her ER contact is ex-/Braden Max [phone]. We reviewed her meds, which she states that she prefers OTC to prescription. Discussed diet, stress & activity level. Pt c/o  having gastrointestinal problems for years. Pt educated on pre and prebiotic's, avoiding sugar and limiting carbs, adding more antiinflammatory foods. Pt will look into this and we will discuss more next week. Offered patient enrollment in the Remote Patient Monitoring (RPM) program for in-home monitoring: NA. Ambulatory Care Coordination Assessment    Care Coordination Protocol  Week 1 - Initial Assessment     Do you have all of your prescriptions and are they filled?: No  Barriers to medication adherence: None  Are you able to afford your medications?: Yes  How often do you have trouble taking your medications the way you have been told to take them?: I do not have to take medications. Do you have Home O2 Therapy?: No      Ability to seek help/take action for Emergent Urgent situations i.e. fire, crime, inclement weather or health crisis. : Independent  Ability to ambulate to restroom: Independent  Ability handle personal hygeine needs (bathing/dressing/grooming): Independent  Ability to manage Medications:  Independent  Ability to prepare Food Preparation:

## 2023-09-07 ENCOUNTER — TELEPHONE (OUTPATIENT)
Dept: INTERNAL MEDICINE CLINIC | Facility: CLINIC | Age: 78
End: 2023-09-07

## 2023-09-07 NOTE — TELEPHONE ENCOUNTER
Spoke with patient's son Renée Rich who is on TERESE and confirmed patient's identity via date of birth. Patient's son concerned about patient's mental status stating that she went to the ER recently for \"not feeling well\". He believes after speaking with a nurse or provider in the ER that she was given a dose of Ativan for suspected anxiety. Since coming home patient has been incredibly disoriented not recalling conversations with either him or his wife and not acting like herself. Patient's son stated that he received a call from patient's gastroenterology office yesterday stating that patient was incredibly disoriented and hallucinating. It was recommended that she go to the ER but patient refused. Patient does have office visit scheduled for 9/12/2023 however I discussed with son that I would be able to see her tomorrow as I feel that her symptoms warrant more urgent evaluation. However I expressed concerns that I would not want patient driving in her current mental state. Patient stated he would call patient after our conversation and arrange a taxi (patient's son lives in [facility]) and that I would have her added to my schedule tomorrow at 2:20 PM.  I also advised him that I would contact him tomorrow at the time of her visit and provided him updates. He verbalized understanding and agreement.

## 2023-09-07 NOTE — TELEPHONE ENCOUNTER
----- Message from [facility] sent at 9/7/2023  8:10 AM EDT -----  Subject: Message to Provider    QUESTIONS  Information for Provider? Morelia Gonsales, son (Hipaa) would like to speak to Dr. Mariah Hay about his mom before her appt on 9/12/23. Please call ASAP. She   has had a decline over the weekend. Edu's numbers are office   [phone] (best during regular business hours), cell is [phone]   ---------------------------------------------------------------------------  --------------  [address] [place] [address] Cory  [phone]; OK to leave message on voicemail  ---------------------------------------------------------------------------  --------------  SCRIPT ANSWERS  Relationship to Patient? Other/Third Party  Representative Name? Morelia Gonsales  Is the representative on the Communication Release of Information (TERESE)   form in Epic?  Yes

## 2023-09-07 NOTE — PROGRESS NOTES
stating that they were concerned about her behavior that she was reportedly hallucinating and did not seem like herself. They had recommended she go to the emergency room to be evaluated but she reportedly refused. Upon my discussion with the son over telephone today he states he personally spoke with the provider who saw the patient at the GI office who stated that patient was reportedly hallucinating during the visit and they had actually called EMS and had the patient loaded into the ambulance but prior to departure she stated that she did not want to go to the hospital, refusing care and driving herself home. Patient denies getting lost while driving taking familiar back roads. Does have a GPS sushant on her phone but does not use it. Patient states that she does remember her appointment at the gastroenterologist office but does not believe she was hallucinating at that time. Review of Systems   Constitutional:  Positive for fatigue. Negative for chills and fever. Eyes:  Positive for visual disturbance (Mild blurred vision in right eye but denies loss of vision). Respiratory:  Negative for shortness of breath. Cardiovascular:  Negative for chest pain. Musculoskeletal:  Negative for neck pain and neck stiffness. Neurological:  Positive for weakness (Generalized) and headaches. Negative for numbness (Nor paresthesias). Psychiatric/Behavioral:  Positive for confusion and hallucinations. Negative for self-injury and suicidal ideas. The patient is nervous/anxious. Objective   Physical Exam  Vitals reviewed. Constitutional:       General: She is not in acute distress. Appearance: She is not ill-appearing, toxic-appearing or diaphoretic. Comments: Pleasant, elderly, thin  female in no acute cardiopulmonary distress   HENT:      Head: Normocephalic and atraumatic.       Mouth/Throat:      Mouth: Mucous membranes are moist.   Eyes:      General:         Right eye: No

## 2023-09-08 ENCOUNTER — OFFICE VISIT (OUTPATIENT)
Dept: INTERNAL MEDICINE CLINIC | Facility: CLINIC | Age: 78
End: 2023-09-08

## 2023-09-08 VITALS
WEIGHT: 108.4 LBS | TEMPERATURE: 98.6 F | BODY MASS INDEX: 18.51 KG/M2 | RESPIRATION RATE: 16 BRPM | SYSTOLIC BLOOD PRESSURE: 130 MMHG | HEART RATE: 85 BPM | OXYGEN SATURATION: 100 % | HEIGHT: 64 IN | DIASTOLIC BLOOD PRESSURE: 80 MMHG

## 2023-09-08 DIAGNOSIS — R41.82 ALTERED MENTAL STATUS, UNSPECIFIED ALTERED MENTAL STATUS TYPE: Primary | ICD-10-CM

## 2023-09-08 ASSESSMENT — PATIENT HEALTH QUESTIONNAIRE - PHQ9
2. FEELING DOWN, DEPRESSED OR HOPELESS: 2
1. LITTLE INTEREST OR PLEASURE IN DOING THINGS: 0
SUM OF ALL RESPONSES TO PHQ QUESTIONS 1-9: 2
SUM OF ALL RESPONSES TO PHQ9 QUESTIONS 1 & 2: 2

## 2023-09-08 ASSESSMENT — ENCOUNTER SYMPTOMS: SHORTNESS OF BREATH: 0

## 2023-09-12 ENCOUNTER — CARE COORDINATION (OUTPATIENT)
Dept: CARE COORDINATION | Facility: CLINIC | Age: 78
End: 2023-09-12

## 2023-09-12 NOTE — CARE COORDINATION
F/u call made, no answer and a message was left. Pt informed that if they have any questions, concerns or any needs that need to be addressed to please call back. Otherwise I will reach out again on 9/19.

## 2023-09-15 DIAGNOSIS — R41.82 ALTERED MENTAL STATUS, UNSPECIFIED ALTERED MENTAL STATUS TYPE: ICD-10-CM

## 2023-09-15 LAB
ALBUMIN SERPL-MCNC: 3.8 G/DL (ref 3.2–4.6)
ALBUMIN/GLOB SERPL: 1.3 (ref 0.4–1.6)
ALP SERPL-CCNC: 66 U/L (ref 50–136)
ALT SERPL-CCNC: 19 U/L (ref 12–65)
ANION GAP SERPL CALC-SCNC: 5 MMOL/L (ref 2–11)
AST SERPL-CCNC: 14 U/L (ref 15–37)
BASOPHILS # BLD: 0.1 K/UL (ref 0–0.2)
BASOPHILS NFR BLD: 1 % (ref 0–2)
BILIRUB SERPL-MCNC: 0.5 MG/DL (ref 0.2–1.1)
BUN SERPL-MCNC: 9 MG/DL (ref 8–23)
CALCIUM SERPL-MCNC: 9.8 MG/DL (ref 8.3–10.4)
CHLORIDE SERPL-SCNC: 108 MMOL/L (ref 101–110)
CO2 SERPL-SCNC: 31 MMOL/L (ref 21–32)
CREAT SERPL-MCNC: 0.7 MG/DL (ref 0.6–1)
DIFFERENTIAL METHOD BLD: ABNORMAL
EOSINOPHIL # BLD: 0 K/UL (ref 0–0.8)
EOSINOPHIL NFR BLD: 1 % (ref 0.5–7.8)
ERYTHROCYTE [DISTWIDTH] IN BLOOD BY AUTOMATED COUNT: 13.4 % (ref 11.9–14.6)
GLOBULIN SER CALC-MCNC: 2.9 G/DL (ref 2.8–4.5)
GLUCOSE SERPL-MCNC: 96 MG/DL (ref 65–100)
HCT VFR BLD AUTO: 42 % (ref 35.8–46.3)
HGB BLD-MCNC: 13.2 G/DL (ref 11.7–15.4)
IMM GRANULOCYTES # BLD AUTO: 0 K/UL (ref 0–0.5)
IMM GRANULOCYTES NFR BLD AUTO: 0 % (ref 0–5)
LYMPHOCYTES # BLD: 1.6 K/UL (ref 0.5–4.6)
LYMPHOCYTES NFR BLD: 30 % (ref 13–44)
MCH RBC QN AUTO: 30.8 PG (ref 26.1–32.9)
MCHC RBC AUTO-ENTMCNC: 31.4 G/DL (ref 31.4–35)
MCV RBC AUTO: 98.1 FL (ref 82–102)
MONOCYTES # BLD: 0.4 K/UL (ref 0.1–1.3)
MONOCYTES NFR BLD: 8 % (ref 4–12)
NEUTS SEG # BLD: 3.3 K/UL (ref 1.7–8.2)
NEUTS SEG NFR BLD: 60 % (ref 43–78)
NRBC # BLD: 0 K/UL (ref 0–0.2)
PLATELET # BLD AUTO: 339 K/UL (ref 150–450)
PMV BLD AUTO: 9.2 FL (ref 9.4–12.3)
POTASSIUM SERPL-SCNC: 4.2 MMOL/L (ref 3.5–5.1)
PROT SERPL-MCNC: 6.7 G/DL (ref 6.3–8.2)
RBC # BLD AUTO: 4.28 M/UL (ref 4.05–5.2)
SODIUM SERPL-SCNC: 144 MMOL/L (ref 133–143)
T4 FREE SERPL-MCNC: 1.4 NG/DL (ref 0.78–1.46)
TSH, 3RD GENERATION: 0.77 UIU/ML (ref 0.36–3.74)
VIT B12 SERPL-MCNC: 765 PG/ML (ref 193–986)
WBC # BLD AUTO: 5.4 K/UL (ref 4.3–11.1)

## 2023-09-18 LAB
RPR SER QL: NONREACTIVE
VIT B1 BLD-SCNC: 113.4 NMOL/L (ref 66.5–200)

## 2023-09-19 ENCOUNTER — TELEPHONE (OUTPATIENT)
Dept: INTERNAL MEDICINE CLINIC | Facility: CLINIC | Age: 78
End: 2023-09-19

## 2023-09-19 NOTE — TELEPHONE ENCOUNTER
Called patient in regard to lab results a few days ago. While speaking to the patient, she mentioned receiving a phone call latanya a week ago and she was told that Dr Princess Calles was sending her to see someone else instead of him. I Informed the patient that I do not see any documentation of this or any referrals to a specialist.     The patient called back today requesting information again for the person she was referred to. She stated she was given papers, but the house  threw her papers out and she no longer has contact information for who she was referred. I let the patient know that I see no documentation from the hospital or from Dr Princess Calles that she is being sent to another provider or to a specialist at this time. The only call I have seen is from the Care Coordinator. The patient voiced understanding. I informed her of her next appointment with Dr Princess Calles and she asked if there was a sooner appointment time as she does not understand why all of her results are coming back as normal but she is not feeling better. Patient reported that she has passed out on the sofa twice in the last few weeks and is often dizzy. I informed the patient that unfortunately we do not have any sooner appointments at this time. The conversation continued to be repetitive of if she needs to be seen by someone else and if her doctor (at this point in time the patient momentarily forgot which doctor we were discussing, recalled Dr pelaez 2 minutes later) no longer wants to see her that is okay, she just needs contact information because she is not getting better and wants to be seen soon. I reiterated to the patient that Dr Princess Calles nor the hospital has referred her to another provider and that she needs to keep her October appointment with Dr Princess Calles.

## 2023-09-20 ENCOUNTER — CARE COORDINATION (OUTPATIENT)
Dept: CARE COORDINATION | Facility: CLINIC | Age: 78
End: 2023-09-20

## 2023-09-20 NOTE — CARE COORDINATION
F/u call made, no answer and a message was left. Pt informed that if they have any questions, concerns or any needs that need to be addressed to please call back.  Otherwise I will reach out again on 9/27

## 2023-09-27 ENCOUNTER — TELEPHONE (OUTPATIENT)
Dept: INTERNAL MEDICINE CLINIC | Facility: CLINIC | Age: 78
End: 2023-09-27

## 2023-09-27 NOTE — TELEPHONE ENCOUNTER
----- Message from Valentin Vasquez sent at 9/26/2023  1:33 PM EDT -----  Subject: Message to Provider    QUESTIONS  Information for Provider? Patient phoned wanting to speak with the office   about a referral that was made for her - patient was very confused -   states a message was left for her to go to a [place] [facility] facility but she   doesn't know where or when - felt as though PCP had given up on her but   now believes he hasn't because of referral made  ---------------------------------------------------------------------------  --------------  [address] [place]  [phone]; Do not leave any message, patient will call back for answer  ---------------------------------------------------------------------------  --------------  SCRIPT ANSWERS  Relationship to Patient?  Self

## 2023-09-27 NOTE — TELEPHONE ENCOUNTER
----- Message from Robin Simon sent at 9/27/2023  3:07 PM EDT -----  Subject: Message to Provider    QUESTIONS  Information for Provider? pt called in and states she felt the dr was   finished with her and didnt want to see her again. She would like to know   if He has a doctor for her to see. Pt was confused about her condtion and   the fact if the practice still wanted to see her. Please call the pt back   regarding  ---------------------------------------------------------------------------  --------------  Jess MOORE  [phone]; OK to leave message on voicemail  ---------------------------------------------------------------------------  --------------  SCRIPT ANSWERS  Relationship to Patient?  Self

## 2023-09-27 NOTE — TELEPHONE ENCOUNTER
Dr Josep Grigsby:     I believe this patient is confused about the testing we are trying to get done for her. I have spoken to this patient 3 times in regard to her confusion of who is calling her. The first conversation I had with her was on September 18th for her lab results. The patient mentioned receiving a call from another doctor stating that her PCP has referred her to them. At this time I informed the patient that I do not see any notations from anyone regarding a new doctor or specialist. After informing you about this, you had mentioned that you ordered an MRI and that is what she may be getting called for. I spoke to the patient again on September 19th regarding this same concern and reiterated to the patient that her PCP has not referred her to another doctor, he has placed an order for an MRI and radiology is trying to schedule that appointment. I informed the patient what the MRI is for and explained that there are different locations for it to be done. The patient requested to see Dr Josep Grigsby sooner even though she had just seen him. I informed the patient that there are not any sooner appointments at that time but that also because no further testing has been done, there isn't anything that he would be able to do for her quite yet. The patient voiced understanding and requested the contact information for Radiology. I recall a day or two after this conversation, the patient called with questions about what an MRI is again and asking to speak with me, but at that time I was unable to take the call due to being with another patient. I asked the  staff to provide her with the information for Radiology so that way she could ask them what an MRI is and how to prep for it as I am not sure of those procedures. This conversation is not documented in the patient's chart prior to this note.  The patient called again requesting to speak with me and once again, I informed her that Dr Josep Grigsby is not trying to

## 2023-09-28 ENCOUNTER — CARE COORDINATION (OUTPATIENT)
Dept: CARE COORDINATION | Facility: CLINIC | Age: 78
End: 2023-09-28

## 2023-09-28 ENCOUNTER — TELEPHONE (OUTPATIENT)
Dept: INTERNAL MEDICINE CLINIC | Facility: CLINIC | Age: 78
End: 2023-09-28

## 2023-09-28 NOTE — TELEPHONE ENCOUNTER
----- Message from Rob Duong sent at 9/28/2023  2:17 PM EDT -----  Subject: Message to Provider    QUESTIONS  Information for Provider? patient is calling in regards to a letter she   was sent that does not say what it is for and does not know what to do   with it.  ---------------------------------------------------------------------------  --------------  [facility] [place] [place]  [phone]; OK to leave message on voicemail  ---------------------------------------------------------------------------  --------------  SCRIPT ANSWERS  Relationship to Patient?  Self

## 2023-09-28 NOTE — TELEPHONE ENCOUNTER
Patient returned call. I asked the patient to read the letter and she seemed confused about the letter itself. She did not read the letter. I asked the patient if she had rescheduled her MRI for Dr Marleny Fraga. Patient stated she had not, she didn't know why she needed to have the scan done. I have had numerous conversations with the patient regarding this MRI and the reason for it. I informed the patient that she has been complaining of memory issues and not feeling well, therefore, Dr Marleny Fraga ordered the MRI for her. The patient stated that she hasn't been able to concentrate on going to different places regarding her health because it is all just too much for her and because she had a bad experience with the hospital. The patient stated that when she went into the hospital she thought that God was going to take her and she wasn't sad about it. She recalled something being put in her brain that settled her down, but then after her visit she could no longer remember people's names or remember what she had done the day of and/or prior. The patient repeatedly mentioned this incident and how much she does not trust the hospital because she thought these people were doctor's and \"everyone I have spoken to about this is furious. My brother-in-law said this could've killed me. But he said it will leave me soon and I will be okay with time\" I asked the patient what she was given, the patient couldn't tell me because she didn't know. I asked the patient if she had anyone she trusted near by that could take her to her appointments and help schedule her visits. Patient stated she does not, but could potentially get someone from [facility] to help her. I informed the patient that she really needs someone she can trust to come to appointments with her and help her keep up with her Doctor appointments. I asked if she would be opposed to me calling her son to ask him to help her schedule the MRI.  Patient asked me not to do that due to

## 2023-09-28 NOTE — CARE COORDINATION
This was my 3rd attempt to reach pt after our initial conversation. Pt had accepted ACM services, but with no return call in 3 attempts, I will have to sign off. My contact information has been shared with pt in the event there circumstances change. They are free to reach out at any time.

## 2023-09-30 ENCOUNTER — TELEPHONE (OUTPATIENT)
Dept: INTERNAL MEDICINE CLINIC | Facility: CLINIC | Age: 78
End: 2023-09-30

## 2023-09-30 DIAGNOSIS — R41.89 COGNITIVE DECLINE: ICD-10-CM

## 2023-09-30 DIAGNOSIS — R41.82 ALTERED MENTAL STATUS, UNSPECIFIED ALTERED MENTAL STATUS TYPE: Primary | ICD-10-CM

## 2023-09-30 NOTE — TELEPHONE ENCOUNTER
Personally called and spoke with the patient's son Karo Phelps who confirmed patient's identity via date of birth. Discussed concerns for myself and my staff about patient still being confused especially given scheduling her MRI and thinking that she is needing to wait for a referral to a specialist.  Son stated that he has talked to his mother multiple times this week including today. Patient felt like she was going to die on Thursday but then got out of the house and drove and went to the grocery store and felt better. Son and I are in agreement that there is likely a psychological component including depression driving patient's symptoms. Son states that patient seems to fixate on her health symptoms and is now concerned that there is something going on within the home contributing to her symptoms. Discussed with son that I would like to proceed with a neurology referral while we are waiting for MRI results to have her further evaluated. He verbalizes understanding and agrees with plan of care. Referral order placed.     Orders Placed This Encounter    [address] [facility] Theresa Colunga DO, Neurology, [place]     Referral Priority:   Routine     Referral Type:   Eval and Treat     Referral Reason:   Specialty Services Required     Referred to Provider:   Kelley Parsons DO     Requested Specialty:   Neurology     Number of Visits Requested:   1

## 2023-10-05 ENCOUNTER — OFFICE VISIT (OUTPATIENT)
Dept: INTERNAL MEDICINE CLINIC | Facility: CLINIC | Age: 78
End: 2023-10-05

## 2023-10-05 VITALS
BODY MASS INDEX: 18.48 KG/M2 | SYSTOLIC BLOOD PRESSURE: 118 MMHG | WEIGHT: 108.25 LBS | OXYGEN SATURATION: 96 % | HEIGHT: 64 IN | DIASTOLIC BLOOD PRESSURE: 78 MMHG | HEART RATE: 99 BPM | TEMPERATURE: 97.7 F

## 2023-10-05 DIAGNOSIS — R41.82 ALTERED MENTAL STATUS, UNSPECIFIED ALTERED MENTAL STATUS TYPE: ICD-10-CM

## 2023-10-05 DIAGNOSIS — R53.82 CHRONIC FATIGUE: Primary | ICD-10-CM

## 2023-10-05 ASSESSMENT — ENCOUNTER SYMPTOMS
VOMITING: 0
NAUSEA: 0
SHORTNESS OF BREATH: 0
DIARRHEA: 0
COUGH: 0
ABDOMINAL PAIN: 0

## 2023-10-05 NOTE — PROGRESS NOTES
[facility]      10/6/2023    Patient Name: Dyan Paget  :  1945      Chief Complaint:  Chief Complaint   Patient presents with    Fatigue     Patient states her fatigue has been better for the past 2 days. She states she feels like a \"whole person\" for the past 2 days. Other     Patient attributes memory loss to black patch that was placed on the back of her neck in the hospital. She states it was ativan that was placed in her neck by an instrument in the ED. HPI  Patient presents today for follow-up on chronic fatigue. She says that when she scheduled the appt initially on , she felt \"bad\". However, fatigue has improved significantly over the past 3-4 days. She reports that she now feels better than she has in over a year. When seen in our office on 23, patient voiced concern about increasing confusion. MMSE completed on 23 with score of 23/30. She attributes the confusion to having \"something black put in the back of my neck\" when seen in the ER on 9/3/23 for complaint of headache. She cannot recall if it was an injection or something that they rubbed on her neck (?) but believes that it was Ativan. She reports that memory issues seemed to start then. Brain MRI ordered on 23, but patient has not yet completed. However, she is agreeable to allowing us to help her get it scheduled today.              Past Medical History:   Diagnosis Date    Atrophic vaginitis     Colon polyp     History of abnormal cervical Pap smear     S/P cervical conization 2016    Hyponatremia     IBS (irritable bowel syndrome)     Lichen sclerosus 9469    Osteoporosis        Past Surgical History:   Procedure Laterality Date    BREAST SURGERY  1968    lumpectomy    COLONOSCOPY  2020    COLONOSCOPY      polyps    GI      GYN      colposcopy    GYN      [facility]    GYN      LEEP    OTHER SURGICAL HISTORY      colonscopy       Family History   Problem Relation Age of Onset

## 2024-04-28 ENCOUNTER — APPOINTMENT (OUTPATIENT)
Dept: CT IMAGING | Age: 79
DRG: 082 | End: 2024-04-28
Payer: MEDICARE

## 2024-04-28 ENCOUNTER — APPOINTMENT (OUTPATIENT)
Dept: GENERAL RADIOLOGY | Age: 79
DRG: 082 | End: 2024-04-28
Payer: MEDICARE

## 2024-04-28 ENCOUNTER — HOSPITAL ENCOUNTER (INPATIENT)
Age: 79
LOS: 1 days | Discharge: HOSPICE | DRG: 082 | End: 2024-04-29
Attending: EMERGENCY MEDICINE | Admitting: HOSPITALIST
Payer: MEDICARE

## 2024-04-28 DIAGNOSIS — I62.00 SUBDURAL HEMORRHAGE (HCC): ICD-10-CM

## 2024-04-28 DIAGNOSIS — S09.90XA CLOSED HEAD INJURY, INITIAL ENCOUNTER: Primary | ICD-10-CM

## 2024-04-28 PROBLEM — Z51.5 PALLIATIVE CARE ENCOUNTER: Status: ACTIVE | Noted: 2024-04-28

## 2024-04-28 PROBLEM — Z51.5 COMFORT MEASURES ONLY STATUS: Status: ACTIVE | Noted: 2024-04-28

## 2024-04-28 PROBLEM — S06.5X9A: Status: ACTIVE | Noted: 2024-04-28

## 2024-04-28 LAB
ALBUMIN SERPL-MCNC: 3.7 G/DL (ref 3.2–4.6)
ALBUMIN/GLOB SERPL: 1.4 (ref 1–1.9)
ALP SERPL-CCNC: 68 U/L (ref 35–104)
ALT SERPL-CCNC: 28 U/L (ref 12–65)
ANION GAP SERPL CALC-SCNC: 11 MMOL/L (ref 9–18)
AST SERPL-CCNC: 36 U/L (ref 15–37)
BASOPHILS # BLD: 0 K/UL (ref 0–0.2)
BASOPHILS NFR BLD: 0 % (ref 0–2)
BILIRUB SERPL-MCNC: 0.4 MG/DL (ref 0–1.2)
BUN SERPL-MCNC: 17 MG/DL (ref 8–23)
CALCIUM SERPL-MCNC: 10 MG/DL (ref 8.8–10.2)
CHLORIDE SERPL-SCNC: 103 MMOL/L (ref 98–107)
CO2 SERPL-SCNC: 27 MMOL/L (ref 20–28)
CREAT SERPL-MCNC: 0.64 MG/DL (ref 0.6–1.1)
DIFFERENTIAL METHOD BLD: ABNORMAL
EOSINOPHIL # BLD: 0 K/UL (ref 0–0.8)
EOSINOPHIL NFR BLD: 0 % (ref 0.5–7.8)
ERYTHROCYTE [DISTWIDTH] IN BLOOD BY AUTOMATED COUNT: 14.5 % (ref 11.9–14.6)
GLOBULIN SER CALC-MCNC: 2.7 G/DL (ref 2.3–3.5)
GLUCOSE SERPL-MCNC: 143 MG/DL (ref 70–99)
HCT VFR BLD AUTO: 34.2 % (ref 35.8–46.3)
HGB BLD-MCNC: 11.1 G/DL (ref 11.7–15.4)
IMM GRANULOCYTES # BLD AUTO: 0.1 K/UL (ref 0–0.5)
IMM GRANULOCYTES NFR BLD AUTO: 1 % (ref 0–5)
LIPASE SERPL-CCNC: 36 U/L (ref 13–60)
LYMPHOCYTES # BLD: 1.8 K/UL (ref 0.5–4.6)
LYMPHOCYTES NFR BLD: 18 % (ref 13–44)
MCH RBC QN AUTO: 30.7 PG (ref 26.1–32.9)
MCHC RBC AUTO-ENTMCNC: 32.5 G/DL (ref 31.4–35)
MCV RBC AUTO: 94.5 FL (ref 82–102)
MONOCYTES # BLD: 0.5 K/UL (ref 0.1–1.3)
MONOCYTES NFR BLD: 5 % (ref 4–12)
NEUTS SEG # BLD: 7.6 K/UL (ref 1.7–8.2)
NEUTS SEG NFR BLD: 76 % (ref 43–78)
NRBC # BLD: 0 K/UL (ref 0–0.2)
PLATELET # BLD AUTO: 220 K/UL (ref 150–450)
PMV BLD AUTO: 9.8 FL (ref 9.4–12.3)
POTASSIUM SERPL-SCNC: 3.3 MMOL/L (ref 3.5–5.1)
PROT SERPL-MCNC: 6.4 G/DL (ref 6.3–8.2)
RBC # BLD AUTO: 3.62 M/UL (ref 4.05–5.2)
SODIUM SERPL-SCNC: 140 MMOL/L (ref 136–145)
WBC # BLD AUTO: 10 K/UL (ref 4.3–11.1)

## 2024-04-28 PROCEDURE — 71045 X-RAY EXAM CHEST 1 VIEW: CPT

## 2024-04-28 PROCEDURE — 72125 CT NECK SPINE W/O DYE: CPT

## 2024-04-28 PROCEDURE — G0378 HOSPITAL OBSERVATION PER HR: HCPCS

## 2024-04-28 PROCEDURE — 93005 ELECTROCARDIOGRAM TRACING: CPT | Performed by: EMERGENCY MEDICINE

## 2024-04-28 PROCEDURE — A4216 STERILE WATER/SALINE, 10 ML: HCPCS | Performed by: EMERGENCY MEDICINE

## 2024-04-28 PROCEDURE — 85025 COMPLETE CBC W/AUTO DIFF WBC: CPT

## 2024-04-28 PROCEDURE — 94760 N-INVAS EAR/PLS OXIMETRY 1: CPT

## 2024-04-28 PROCEDURE — 2580000003 HC RX 258: Performed by: EMERGENCY MEDICINE

## 2024-04-28 PROCEDURE — 6360000002 HC RX W HCPCS: Performed by: HOSPITALIST

## 2024-04-28 PROCEDURE — 96374 THER/PROPH/DIAG INJ IV PUSH: CPT

## 2024-04-28 PROCEDURE — A4216 STERILE WATER/SALINE, 10 ML: HCPCS | Performed by: HOSPITALIST

## 2024-04-28 PROCEDURE — 2700000000 HC OXYGEN THERAPY PER DAY

## 2024-04-28 PROCEDURE — 80053 COMPREHEN METABOLIC PANEL: CPT

## 2024-04-28 PROCEDURE — 96376 TX/PRO/DX INJ SAME DRUG ADON: CPT

## 2024-04-28 PROCEDURE — 70450 CT HEAD/BRAIN W/O DYE: CPT

## 2024-04-28 PROCEDURE — 6360000002 HC RX W HCPCS: Performed by: EMERGENCY MEDICINE

## 2024-04-28 PROCEDURE — 96375 TX/PRO/DX INJ NEW DRUG ADDON: CPT

## 2024-04-28 PROCEDURE — 6360000002 HC RX W HCPCS: Performed by: INTERNAL MEDICINE

## 2024-04-28 PROCEDURE — 83690 ASSAY OF LIPASE: CPT

## 2024-04-28 PROCEDURE — 2580000003 HC RX 258: Performed by: HOSPITALIST

## 2024-04-28 PROCEDURE — 99285 EMERGENCY DEPT VISIT HI MDM: CPT

## 2024-04-28 RX ORDER — MORPHINE SULFATE 2 MG/ML
2 INJECTION, SOLUTION INTRAMUSCULAR; INTRAVENOUS
Status: DISCONTINUED | OUTPATIENT
Start: 2024-04-28 | End: 2024-04-29 | Stop reason: HOSPADM

## 2024-04-28 RX ORDER — POLYETHYLENE GLYCOL 3350 17 G/17G
17 POWDER, FOR SOLUTION ORAL DAILY PRN
Status: DISCONTINUED | OUTPATIENT
Start: 2024-04-28 | End: 2024-04-29 | Stop reason: HOSPADM

## 2024-04-28 RX ORDER — MORPHINE SULFATE 4 MG/ML
4 INJECTION, SOLUTION INTRAMUSCULAR; INTRAVENOUS
Status: DISCONTINUED | OUTPATIENT
Start: 2024-04-28 | End: 2024-04-29 | Stop reason: HOSPADM

## 2024-04-28 RX ORDER — SODIUM CHLORIDE 0.9 % (FLUSH) 0.9 %
5-40 SYRINGE (ML) INJECTION EVERY 12 HOURS SCHEDULED
Status: DISCONTINUED | OUTPATIENT
Start: 2024-04-28 | End: 2024-04-29 | Stop reason: HOSPADM

## 2024-04-28 RX ORDER — GLYCOPYRROLATE 0.2 MG/ML
0.2 INJECTION INTRAMUSCULAR; INTRAVENOUS EVERY 4 HOURS PRN
Status: DISCONTINUED | OUTPATIENT
Start: 2024-04-28 | End: 2024-04-29

## 2024-04-28 RX ORDER — LOPERAMIDE HYDROCHLORIDE 2 MG/1
2 CAPSULE ORAL PRN
Status: DISCONTINUED | OUTPATIENT
Start: 2024-04-28 | End: 2024-04-29 | Stop reason: HOSPADM

## 2024-04-28 RX ORDER — MORPHINE SULFATE 4 MG/ML
4 INJECTION, SOLUTION INTRAMUSCULAR; INTRAVENOUS
Status: COMPLETED | OUTPATIENT
Start: 2024-04-28 | End: 2024-04-28

## 2024-04-28 RX ORDER — PROCHLORPERAZINE EDISYLATE 5 MG/ML
10 INJECTION INTRAMUSCULAR; INTRAVENOUS
Status: COMPLETED | OUTPATIENT
Start: 2024-04-28 | End: 2024-04-28

## 2024-04-28 RX ORDER — SODIUM CHLORIDE 0.9 % (FLUSH) 0.9 %
5-40 SYRINGE (ML) INJECTION PRN
Status: DISCONTINUED | OUTPATIENT
Start: 2024-04-28 | End: 2024-04-29 | Stop reason: HOSPADM

## 2024-04-28 RX ORDER — SODIUM CHLORIDE 9 MG/ML
INJECTION, SOLUTION INTRAVENOUS PRN
Status: DISCONTINUED | OUTPATIENT
Start: 2024-04-28 | End: 2024-04-29 | Stop reason: HOSPADM

## 2024-04-28 RX ORDER — 0.9 % SODIUM CHLORIDE 0.9 %
1000 INTRAVENOUS SOLUTION INTRAVENOUS
Status: COMPLETED | OUTPATIENT
Start: 2024-04-28 | End: 2024-04-28

## 2024-04-28 RX ORDER — ONDANSETRON 2 MG/ML
4 INJECTION INTRAMUSCULAR; INTRAVENOUS EVERY 6 HOURS PRN
Status: DISCONTINUED | OUTPATIENT
Start: 2024-04-28 | End: 2024-04-29 | Stop reason: HOSPADM

## 2024-04-28 RX ORDER — BISACODYL 10 MG
10 SUPPOSITORY, RECTAL RECTAL DAILY PRN
Status: DISCONTINUED | OUTPATIENT
Start: 2024-04-28 | End: 2024-04-29 | Stop reason: HOSPADM

## 2024-04-28 RX ORDER — MORPHINE SULFATE 20 MG/ML
5 SOLUTION ORAL
Status: DISCONTINUED | OUTPATIENT
Start: 2024-04-28 | End: 2024-04-29 | Stop reason: HOSPADM

## 2024-04-28 RX ORDER — ACETAMINOPHEN 325 MG/1
650 TABLET ORAL EVERY 4 HOURS PRN
Status: DISCONTINUED | OUTPATIENT
Start: 2024-04-28 | End: 2024-04-29 | Stop reason: HOSPADM

## 2024-04-28 RX ADMIN — MORPHINE SULFATE 4 MG: 4 INJECTION INTRAVENOUS at 23:48

## 2024-04-28 RX ADMIN — PROCHLORPERAZINE EDISYLATE 10 MG: 5 INJECTION INTRAMUSCULAR; INTRAVENOUS at 13:00

## 2024-04-28 RX ADMIN — MORPHINE SULFATE 4 MG: 4 INJECTION INTRAVENOUS at 17:58

## 2024-04-28 RX ADMIN — MORPHINE SULFATE 4 MG: 4 INJECTION INTRAVENOUS at 20:20

## 2024-04-28 RX ADMIN — GLYCOPYRROLATE 0.2 MG: 0.2 INJECTION INTRAMUSCULAR; INTRAVENOUS at 20:30

## 2024-04-28 RX ADMIN — MORPHINE SULFATE 4 MG: 4 INJECTION INTRAVENOUS at 14:23

## 2024-04-28 RX ADMIN — SODIUM CHLORIDE, PRESERVATIVE FREE 5 ML: 5 INJECTION INTRAVENOUS at 21:46

## 2024-04-28 RX ADMIN — SODIUM CHLORIDE 1 MG: 9 INJECTION, SOLUTION INTRAMUSCULAR; INTRAVENOUS; SUBCUTANEOUS at 14:17

## 2024-04-28 RX ADMIN — SODIUM CHLORIDE 2 MG: 9 INJECTION INTRAMUSCULAR; INTRAVENOUS; SUBCUTANEOUS at 17:58

## 2024-04-28 RX ADMIN — SODIUM CHLORIDE 1000 ML: 9 INJECTION, SOLUTION INTRAVENOUS at 13:00

## 2024-04-28 NOTE — PROGRESS NOTES
Pt arrived from the ED with agonal breathing.  Pt made comfortable in room and given comfort medications.  Son notified of pt current condition.  Will continue to monitor and give medications as needed to maintain comfort.

## 2024-04-28 NOTE — PROGRESS NOTES
TRANSFER - IN REPORT:    Verbal report received from brandt lawrence on Elizabeth Yeh  being received from ed for routine progression of patient care      Report consisted of patient's Situation, Background, Assessment and   Recommendations(SBAR).     Information from the following report(s) Nurse Handoff Report was reviewed with the receiving nurse.    Opportunity for questions and clarification was provided.      Assessment completed upon patient's arrival to unit and care assumed.

## 2024-04-28 NOTE — ED PROVIDER NOTES
subdural in the midline.    There is demonstration of a right occipital skull fracture extending into the  skull base but not clearly extending into the foramen magnum. The mastoid air  cells are aerated.    The visualized paranasal sinuses demonstrate mild mucoperiosteal thickening of  the right maxillary antrum.       Impression    1. Large right hemispheric subdural hematoma with multiple differential  attenuation changes likely representing multiple episodes of hemorrhage. This  extends into the parafalcine region. A left frontal subtle subdural hematoma is  also noted with low-attenuation changes developing in the left frontal lobe  since previous comparison of April 18, 2023. This may be related to injury  and/or infarction. Clinical correlation is recommended. Findings notified and  discussed over the phone with Dr. Jas Flor, using [employer] cell phone  service.  2. Skull base fracture on the right, short of extension into the foramen magnum.  The right temporal bone/mastoid air cells remain aerated.   CT CERVICAL SPINE WO CONTRAST    Narrative    CT of the Cervical Spine    INDICATION: Fall    TECHNIQUE: [id number]  axial images were obtained through the cervical spine  without intravenous contrast. Coronal and sagittal reformatted images were also  reviewed.  Radiation dose reduction techniques were used for this study:  All CT  scans performed at this facility use one or all of the following: Automated  exposure control, adjustment of the mA and/or kVp according to patient's size,  iterative reconstruction.    COMPARISON: None    FINDINGS:  Bilateral/biapical pleural and parenchymal consolidations are present.  Correlation is recommended.    Vertebral body height and alignment is intact. Moderate posterior intervertebral  disc space narrowing at C3-C4, severe at C4-C5 and C5-C6 noted. Moderate diffuse  intervertebral disc space narrowing at C6-C7. No prevertebral soft

## 2024-04-28 NOTE — ED TRIAGE NOTES
Pt arrives via EMS from the [facility] on [address]. Ems called for unwitnessed fall. Pt hx of dimentia. No evidence of trauma. Ems states hx of current intracerrebral  brain bleed. Pt also prescribed routine q6 hour zofran.

## 2024-04-28 NOTE — PROGRESS NOTES
NEUROSURGERY PLAN OF CARE NOTE:     Chart and Imaging Reviewed: Patient Discussed with Dr. Chacho Yeh 79 y.o. female presented to [facility] following an unwitnessed fall at her Memory Care facility [facility] on [address]. She suffered a chronic subdural hematoma and hemorrhagic ICH in October of 2023 and was treated non-surgically at [facility] in [place] and discharged to rehab.  She has a history of dementia that has been progressing per family over the last few months. I have independently reviewed and interpreted the CT Head WO contrast which is limited secondary to motion degradation artifact but demonstrates a large right holohemispheric acute on chronic subdural hematoma that results in profound brain compression, uncal and subfalcine herniation with right to left shift measuring 1.3 cm at the level of the foramen of Johansen. The basilar cisterns are effaced. She is not anti-coagulated. The patient has suffered a devastating neurological insult and given she is DNR with history of progressive dementia the patient's family/medical decision makers want her to remain DNR and elect against aggressive surgical measures. Recommend compassionate care as she will like succumb to the this neurological insult. Please call with questions or concerns.     I spent a total of 5-10 minutes of medical consultative discussion and review.     Anuj Reyna MD, FAANS  Neurosurgeon  [facility]  [facility]

## 2024-04-28 NOTE — PROGRESS NOTES
Pt son, Edu, contacted by this RN. Son is aware of the situation and is on the way to facility. Son states ETA at 2330 tonight (he resides in [place], [place]). This RN provided the son with 7th floor contact information and pt update regarding new medications that are to be administered. Pt son is in agreeance with current plan of care.     Marixa Barajas, JULISSA   1930 04/28/24

## 2024-04-29 ENCOUNTER — HOSPICE ADMISSION (OUTPATIENT)
Dept: HOSPICE | Facility: HOSPICE | Age: 79
End: 2024-04-29
Payer: MEDICARE

## 2024-04-29 VITALS
SYSTOLIC BLOOD PRESSURE: 131 MMHG | HEART RATE: 90 BPM | RESPIRATION RATE: 10 BRPM | TEMPERATURE: 97.3 F | OXYGEN SATURATION: 100 % | DIASTOLIC BLOOD PRESSURE: 60 MMHG

## 2024-04-29 PROBLEM — M15.3 POST-TRAUMATIC OSTEOARTHRITIS OF MULTIPLE JOINTS: Status: ACTIVE | Noted: 2023-11-13

## 2024-04-29 PROBLEM — R43.2 AGEUSIA: Status: ACTIVE | Noted: 2024-04-29

## 2024-04-29 PROBLEM — Z13.220 LIPID SCREENING: Status: RESOLVED | Noted: 2021-06-01 | Resolved: 2024-04-29

## 2024-04-29 PROBLEM — K22.2 ESOPHAGEAL OBSTRUCTION: Status: ACTIVE | Noted: 2024-04-29

## 2024-04-29 PROBLEM — G44.319 ACUTE POST-TRAUMATIC HEADACHE, NOT INTRACTABLE: Status: ACTIVE | Noted: 2024-04-29

## 2024-04-29 PROBLEM — R12 HEARTBURN: Status: ACTIVE | Noted: 2024-04-29

## 2024-04-29 PROBLEM — K21.9 GASTRO-ESOPHAGEAL REFLUX DISEASE WITHOUT ESOPHAGITIS: Status: ACTIVE | Noted: 2024-04-29

## 2024-04-29 PROBLEM — Z12.39 BREAST CANCER SCREENING: Status: RESOLVED | Noted: 2021-06-01 | Resolved: 2024-04-29

## 2024-04-29 PROBLEM — K44.9 DIAPHRAGMATIC HERNIA WITHOUT OBSTRUCTION OR GANGRENE: Status: ACTIVE | Noted: 2024-04-29

## 2024-04-29 PROBLEM — R13.10 DYSPHAGIA: Status: ACTIVE | Noted: 2024-04-29

## 2024-04-29 PROBLEM — R44.3 HALLUCINATION: Status: ACTIVE | Noted: 2024-04-29

## 2024-04-29 PROBLEM — F07.81 TRAUMATIC ENCEPHALOPATHY: Status: RESOLVED | Noted: 2023-10-27 | Resolved: 2024-04-29

## 2024-04-29 PROBLEM — K29.70 GASTRITIS, UNSPECIFIED, WITHOUT BLEEDING: Status: ACTIVE | Noted: 2024-04-29

## 2024-04-29 PROBLEM — F07.81 TRAUMATIC ENCEPHALOPATHY: Status: ACTIVE | Noted: 2023-10-27

## 2024-04-29 PROBLEM — Z23 ENCOUNTER FOR IMMUNIZATION: Status: RESOLVED | Noted: 2021-07-20 | Resolved: 2024-04-29

## 2024-04-29 PROBLEM — K57.92 DIVERTICULITIS: Status: ACTIVE | Noted: 2024-04-29

## 2024-04-29 PROBLEM — S06.5X9S: Status: ACTIVE | Noted: 2024-04-29

## 2024-04-29 PROBLEM — E87.1 HYPONATREMIA: Status: RESOLVED | Noted: 2021-08-21 | Resolved: 2024-04-29

## 2024-04-29 PROBLEM — F32.A ANXIETY AND DEPRESSION: Status: ACTIVE | Noted: 2023-11-13

## 2024-04-29 PROBLEM — R44.3 HALLUCINATION: Status: RESOLVED | Noted: 2024-04-29 | Resolved: 2024-04-29

## 2024-04-29 PROBLEM — R63.4 UNINTENTIONAL WEIGHT LOSS: Status: ACTIVE | Noted: 2024-04-29

## 2024-04-29 PROBLEM — R53.81 DEBILITY: Status: ACTIVE | Noted: 2023-10-27

## 2024-04-29 PROBLEM — F41.9 ANXIETY AND DEPRESSION: Status: ACTIVE | Noted: 2023-11-13

## 2024-04-29 PROBLEM — R53.83 FATIGUE: Status: RESOLVED | Noted: 2021-06-01 | Resolved: 2024-04-29

## 2024-04-29 PROBLEM — S06.5X4S: Status: ACTIVE | Noted: 2024-04-29

## 2024-04-29 PROBLEM — R13.12 OROPHARYNGEAL DYSPHAGIA: Status: ACTIVE | Noted: 2023-11-13

## 2024-04-29 PROBLEM — E87.6 HYPOKALEMIA: Status: RESOLVED | Noted: 2021-08-21 | Resolved: 2024-04-29

## 2024-04-29 PROBLEM — S06.5X4A: Status: ACTIVE | Noted: 2024-04-28

## 2024-04-29 PROBLEM — I62.9 INTRACRANIAL BLEED (HCC): Status: ACTIVE | Noted: 2024-04-29

## 2024-04-29 PROBLEM — S02.19XA: Status: ACTIVE | Noted: 2023-11-13

## 2024-04-29 PROBLEM — M15.3 POST-TRAUMATIC OSTEOARTHRITIS OF MULTIPLE JOINTS: Status: RESOLVED | Noted: 2023-11-13 | Resolved: 2024-04-29

## 2024-04-29 PROBLEM — S06.35AA: Status: ACTIVE | Noted: 2023-10-24

## 2024-04-29 PROBLEM — I10 PRIMARY HYPERTENSION: Status: ACTIVE | Noted: 2023-12-22

## 2024-04-29 LAB
EKG ATRIAL RATE: 82 BPM
EKG DIAGNOSIS: NORMAL
EKG P AXIS: 87 DEGREES
EKG P-R INTERVAL: 162 MS
EKG Q-T INTERVAL: 402 MS
EKG QRS DURATION: 90 MS
EKG QTC CALCULATION (BAZETT): 470 MS
EKG R AXIS: 79 DEGREES
EKG T AXIS: 69 DEGREES
EKG VENTRICULAR RATE: 82 BPM

## 2024-04-29 PROCEDURE — 2580000003 HC RX 258: Performed by: HOSPITALIST

## 2024-04-29 PROCEDURE — 6360000002 HC RX W HCPCS: Performed by: INTERNAL MEDICINE

## 2024-04-29 PROCEDURE — 1100000000 HC RM PRIVATE

## 2024-04-29 PROCEDURE — 0656 HSPC GENERAL INPATIENT

## 2024-04-29 PROCEDURE — 96376 TX/PRO/DX INJ SAME DRUG ADON: CPT

## 2024-04-29 PROCEDURE — 93010 ELECTROCARDIOGRAM REPORT: CPT | Performed by: INTERNAL MEDICINE

## 2024-04-29 PROCEDURE — G0378 HOSPITAL OBSERVATION PER HR: HCPCS

## 2024-04-29 RX ORDER — LORAZEPAM 0.5 MG/1
0.5 TABLET ORAL 3 TIMES DAILY PRN
COMMUNITY
Start: 2023-11-13

## 2024-04-29 RX ORDER — POLYETHYLENE GLYCOL 3350 17 G/17G
17 POWDER, FOR SOLUTION ORAL DAILY PRN
COMMUNITY
Start: 2024-01-22 | End: 2025-01-21

## 2024-04-29 RX ORDER — LANOLIN ALCOHOL/MO/W.PET/CERES
3 CREAM (GRAM) TOPICAL
COMMUNITY
Start: 2023-11-13 | End: 2024-11-12

## 2024-04-29 RX ORDER — ACETAMINOPHEN 325 MG/1
650 TABLET ORAL EVERY 4 HOURS PRN
COMMUNITY
Start: 2023-11-13 | End: 2024-12-22

## 2024-04-29 RX ORDER — MIRTAZAPINE 7.5 MG/1
7.5 TABLET, FILM COATED ORAL NIGHTLY
COMMUNITY
Start: 2024-04-07

## 2024-04-29 RX ORDER — ONDANSETRON 4 MG/1
4 TABLET, ORALLY DISINTEGRATING ORAL EVERY 6 HOURS PRN
COMMUNITY
Start: 2023-11-13

## 2024-04-29 RX ADMIN — GLYCOPYRROLATE 0.2 MG: 0.2 INJECTION INTRAMUSCULAR; INTRAVENOUS at 02:07

## 2024-04-29 RX ADMIN — SODIUM CHLORIDE, PRESERVATIVE FREE 5 ML: 5 INJECTION INTRAVENOUS at 08:18

## 2024-04-29 RX ADMIN — GLYCOPYRROLATE 0.2 MG: 0.2 INJECTION INTRAMUSCULAR; INTRAVENOUS at 11:40

## 2024-04-29 NOTE — PROGRESS NOTES
responded to a request to provided spiritual care and emotional support to a pt's family member. An initial visit was made to the patient and her son. The pt is from a Bahai cas[place] background. The pt has two sons, one of them is in [place] and Edu flew in today to visit with his mother. The pt had been moved to [place] care. Edu shared about his mother's life and spiritual journey; he said that his mother was a \"great mother, beautiful person and good Sabianism.\"  offered words of comfort and encouragement, read [facility] 62:5-8 and prayed for the pt and her son.  provided spiritual care and emotional support through pastoral presence, non-anxious presence, active listening, meaningful conversation, Bible reference, and prayer. CH is available as needed.

## 2024-04-29 NOTE — CARE COORDINATION
Referral was sent to [facility] who will be in touch with family.

## 2024-04-29 NOTE — DISCHARGE SUMMARY
Hospitalist Discharge Summary   Admit Date:  2024 12:40 PM   DC Note date: 2024  Name:  Elizabeth Yeh   Age:  79 y.o.  Sex:  female  :  1945   MRN:  [id number]   Room:  [facility]  PCP:  Supa Doyle DO    Presenting Complaint: Fall     Initial Admission Diagnosis: Subdural hemorrhage (HCC) [I62.00]  Closed head injury, initial encounter [S09.90XA]  Traumatic subdural hemorrhage with loss of consciousness, initial encounter ([facility]) [S06.5X9A]  Intracranial bleed ([facility]) [I62.9]     Problem List for this Hospitalization (present on admission):    Principal Problem:    Comfort measures only status  Active Problems:    Traumatic subdural hemorrhage with loss of consciousness, initial encounter ([facility])    Palliative care patient    Intracranial bleed ([facility])  Resolved Problems:    * No resolved hospital problems. *      Hospital Course:  Elizabeth Yeh is a 79 y.o. female with medical history of  Chronic subdural hematoma and intracranial hemorrhage in 2023, dementia, was brought to the ER via EMS due to altered mental status on 24.  CT head on admission shows large right hemispheric subdural hematoma with multiple attenuation changes likely representing multiple episodes of hemorrhage.  Left frontal subdural subdural hematoma is noted.  Neurosurgery was consulted.  Not on blood thinners per chart review.  Patient has profound brain compression, uncal and subfalcine herniation with right to left shift measuring 1.3 cm at the level of foramen of Monro.  She is not anticoagulated.  Neurosurgery recommends comfort care.     Unable to obtain history of present illness and review of systems from patient's secondary to her mental status.  She is minimally responsive with intermittent apneic breathing.     Patient admitted for hospice/comfort care due to significant right hemispheric subdural hematoma with right to left midline shift.      Patient has been for inpatient

## 2024-04-29 NOTE — PROGRESS NOTES
TRANSFER - OUT REPORT:    Verbal report given to JULISSA Lazo on Elizabeth Yeh  being transferred to [facility] for routine progression of patient care       Report consisted of patient's Situation, Background, Assessment and   Recommendations(SBAR).     Information from the following report(s) Nurse Handoff Report was reviewed with the receiving nurse.           Lines:   Peripheral IV 04/28/24 Right Antecubital (Active)   Site Assessment Clean, dry & intact 04/29/24 0805   Line Status Capped;Flushed 04/29/24 0805   Line Care Cap changed;Connections checked and tightened 04/29/24 0805   Phlebitis Assessment No symptoms 04/29/24 0805   Infiltration Assessment 0 04/29/24 0805   Alcohol Cap Used Yes 04/29/24 0805   Dressing Status Clean, dry & intact 04/29/24 0805   Dressing Type Transparent 04/29/24 0805        Opportunity for questions and clarification was provided.      Patient transported with:  O2 @ 4.5 lpm

## 2024-04-29 NOTE — PROGRESS NOTES
Pt son, Edu, arrived to unit and was updated by this RN. He is at the bedside with pt and has requested to speak with the . This RN contacted  at the family's request.  in route.     Marixa Barajas RN   04/28/24

## 2024-04-29 NOTE — CARE COORDINATION
CM consult received to assist with arranging hospice services.  Referral was sent to [facility] this AM who has met with patient and son at bedside.  CM was notified that patient has been accepted to [facility] for end of life care.  Son is agreeable and [facility] liaison will arrange all discharge/transport details.  Attending notified.     04/29/24 0061   Service Assessment   Patient Orientation Unresponsive   Cognition Dementia / Early Alzheimer's   History Provided By Medical Record   Primary Caregiver Other (Comment)  ([facility] staff)   Accompanied By/Relationship Son   Support Systems Children;Other (Comment)  ([facility] staff)   Patient's Healthcare Decision Maker is: Named in Scanned ACP Document   PCP Verified by CM Yes   Last Visit to PCP Within last 3 months   Prior Functional Level Assistance with the following:;Bathing;Dressing;Toileting;Cooking;Housework;Shopping;Mobility   Current Functional Level Assistance with the following:;Bathing;Dressing;Toileting;Feeding;Cooking;Housework;Shopping;Mobility   Can patient return to prior living arrangement No   Ability to make needs known: Unable   Family able to assist with home care needs: Yes   Would you like for me to discuss the discharge plan with any other family members/significant others, and if so, who? No   Financial Resources Medicare   Community Resources Assisted Living   CM/SW Referral End of life   Social/Functional History   Lives With Other (comment)  ([facility])   Type of Home Assisted living   Services At/After Discharge   Transition of Care Consult (CM Consult) Hospice   Internal Hospice Yes   Services At/After Discharge Transport;In ambulance   Mode of Transport at Discharge BLS   Condition of Participation: Discharge Planning   The Plan for Transition of Care is related to the following treatment goals: Patient will discharge to [facility] for end of life care

## 2024-04-29 NOTE — PROGRESS NOTES
[facility]    Comments:On 4/29/24 Dr. Yared Riddle verbally certified that Elizabeth is terminally ill with a life expectancy of 6 months or less if the terminal illness runs its normal course. Verbal certification received by: Marixa Muhammad RN on 4/29/24 at 1315.             Plan: Discharge to [facility] at 1900    Follow up:       Evaluation date and time   4/29/24 @ 1245   Location and Persons present @ time of eval Pt and son Edu   Patient cognition Obtunded   History provided by Chart and family   Living arrangements Had been living in memory care unit at [facility] on [address]   Primary Caregiver Edu Wesleygabriellagillianjadon   Support systems Juan Sorensen   Patient's Health Care Decision Maker  Son Edu Yeh   Family willing to assist with home care needs No family in state.  2 sons who reside elsewhere   Brief synopsis of pt. background Pt had been hospitalized back in Oct for a subdural hematoma and released to rehab.  On 4/27 pt had a fall without injury at facility.  On 4/28 pt had another fall which resulted in a closed head injury.  CT revealed a right subdural hematoma with profound brain compression, herniation with right to left shift measuring 1.3 cm.     Why Hospice and Why now? *Hospice philosophy and comfort care discussion occurred with juan Sorensen who in light of CT results elected comfort measures.  Discussion of CPR, hospitalization, and other life sustaining preference occurred.     LCD/Prognostic Indicators Sequelae of subdural hematoma   Level of Hospice Care and why GIP vs. RHC to include: recent medications and treatments GIP for treatment of terminal symptoms to include SOB and pain  MSO4 x3  Robinal x3     Plan to manage symptoms and goal to transition to RHC Symptoms will be evaluated daily for continued eligibility with the goal to return pt to routine level of care once symptoms are managed   Wounds, Lines, Drains and Airway N/A   RHC pt. symptoms at DC, Rxs, Enclara,

## 2024-04-30 ENCOUNTER — CARE COORDINATION (OUTPATIENT)
Dept: CARE COORDINATION | Facility: CLINIC | Age: 79
End: 2024-04-30

## 2024-04-30 PROCEDURE — 0656 HSPC GENERAL INPATIENT

## 2024-04-30 NOTE — H&P
History and Physical    Patient: Elizabeth Yeh MRN: [id number]  SSN: [id number]    YOB: 1945  Age: 79 y.o.  Sex: female      Subjective:      Elizabeth Yeh is a 79 y.o. female who ***.     Past Medical History:   Diagnosis Date    Atrophic vaginitis     Colon polyp     History of abnormal cervical Pap smear     S/P cervical conization     Hyponatremia     IBS (irritable bowel syndrome)     Lichen sclerosus 2018    Osteoporosis      Past Surgical History:   Procedure Laterality Date    BREAST SURGERY  1968    lumpectomy    COLONOSCOPY  2020    COLONOSCOPY      polyps    GI      GYN      colposcopy    GYN      [facility]    GYN      LEEP    OTHER SURGICAL HISTORY      colonscopy      Family History   Problem Relation Age of Onset    Colon Cancer Neg Hx     Breast Cancer Neg Hx     Lung Disease Father     Cancer Father         Lung    Alzheimer's Disease Mother     Obesity Mother     Cancer Brother         Brain Tumor    Hypertension Mother      Social History     Tobacco Use    Smoking status: Former     Current packs/day: 0.00     Types: Cigarettes     Quit date: 10/17/1993     Years since quittin.5    Smokeless tobacco: Never   Substance Use Topics    Alcohol use: Never      Prior to Admission medications    Medication Sig Start Date End Date Taking? Authorizing Provider   acetaminophen (TYLENOL) 325 MG tablet Take 2 tablets by mouth every 4 hours as needed 23 Yes ProviderNikki MD   polyethylene glycol (GLYCOLAX) 17 g packet Take 1 packet by mouth daily as needed 24 Yes Nikki Knox MD   ondansetron (ZOFRAN-ODT) 4 MG disintegrating tablet Take 1 tablet by mouth every 6 hours as needed 23  Yes Nikki Knox MD   mirtazapine (REMERON) 7.5 MG tablet Take 1 tablet by mouth nightly 24  Yes Nikki Knox MD   melatonin 3 MG TABS tablet Take 1 tablet by mouth Daily with supper 23 Yes 
base fracture without extension into the foramen magnum.     CT HEAD WO CONTRAST    Result Date: 4/28/2024  Head CT INDICATION: Fall and headache TECHNIQUE: Multiple 2D axial images obtained through the brain without intravenous contrast.  Radiation dose reduction techniques were used for this study:  All CT scans performed at this facility use one or all of the following: Automated exposure control, adjustment of the mA and/or kVp according to patient's size, iterative reconstruction. COMPARISON: CT head April 18, 2023 FINDINGS there are multiple differential attenuation subdural hematomas in the right hemiconvexity with effacement of the sulci on the right with a right to left midline shift of 1.3 cm. Small left frontal subdural is also suspected. New left frontal low-attenuation region is developed since previous comparison of April 18, 2023. Parafalcine hyperattenuation is also noted likely related to extension of the subdural in the midline. There is demonstration of a right occipital skull fracture extending into the skull base but not clearly extending into the foramen magnum. The mastoid air cells are aerated. The visualized paranasal sinuses demonstrate mild mucoperiosteal thickening of the right maxillary antrum.     1. Large right hemispheric subdural hematoma with multiple differential attenuation changes likely representing multiple episodes of hemorrhage. This extends into the parafalcine region. A left frontal subtle subdural hematoma is also noted with low-attenuation changes developing in the left frontal lobe since previous comparison of April 18, 2023. This may be related to injury and/or infarction. Clinical correlation is recommended. Findings notified and discussed over the phone with Dr. Jas Flor, using [employer] cell phone service. 2. Skull base fracture on the right, short of extension into the foramen magnum. The right temporal bone/mastoid air cells remain aerated.    XR CHEST

## 2024-05-01 PROCEDURE — 0656 HSPC GENERAL INPATIENT

## 2024-05-02 PROBLEM — I69.10 SEQUELAE OF NONTRAUMATIC INTRACEREBRAL HEMORRHAGE: Status: ACTIVE | Noted: 2024-05-02

## 2024-05-02 PROCEDURE — 0656 HSPC GENERAL INPATIENT

## 2024-05-03 PROCEDURE — 0651 HSPC ROUTINE HOME CARE
